# Patient Record
Sex: FEMALE | Race: WHITE | NOT HISPANIC OR LATINO | Employment: FULL TIME | URBAN - METROPOLITAN AREA
[De-identification: names, ages, dates, MRNs, and addresses within clinical notes are randomized per-mention and may not be internally consistent; named-entity substitution may affect disease eponyms.]

---

## 2022-06-16 ENCOUNTER — LAB REQUISITION (OUTPATIENT)
Dept: LAB | Facility: CLINIC | Age: 32
End: 2022-06-16

## 2022-06-16 DIAGNOSIS — Z34.01 ENCOUNTER FOR SUPERVISION OF NORMAL FIRST PREGNANCY, FIRST TRIMESTER: ICD-10-CM

## 2022-06-16 LAB
ABO/RH(D): NORMAL
ANTIBODY SCREEN: NEGATIVE
BASOPHILS # BLD AUTO: 0 10E3/UL (ref 0–0.2)
BASOPHILS NFR BLD AUTO: 1 %
EOSINOPHIL # BLD AUTO: 0.3 10E3/UL (ref 0–0.7)
EOSINOPHIL NFR BLD AUTO: 4 %
ERYTHROCYTE [DISTWIDTH] IN BLOOD BY AUTOMATED COUNT: 11.8 % (ref 10–15)
HCT VFR BLD AUTO: 36.3 % (ref 35–47)
HGB BLD-MCNC: 12.3 G/DL (ref 11.7–15.7)
IMM GRANULOCYTES # BLD: 0 10E3/UL
IMM GRANULOCYTES NFR BLD: 0 %
LYMPHOCYTES # BLD AUTO: 1.4 10E3/UL (ref 0.8–5.3)
LYMPHOCYTES NFR BLD AUTO: 21 %
MCH RBC QN AUTO: 30.8 PG (ref 26.5–33)
MCHC RBC AUTO-ENTMCNC: 33.9 G/DL (ref 31.5–36.5)
MCV RBC AUTO: 91 FL (ref 78–100)
MONOCYTES # BLD AUTO: 0.4 10E3/UL (ref 0–1.3)
MONOCYTES NFR BLD AUTO: 7 %
NEUTROPHILS # BLD AUTO: 4.4 10E3/UL (ref 1.6–8.3)
NEUTROPHILS NFR BLD AUTO: 67 %
NRBC # BLD AUTO: 0 10E3/UL
NRBC BLD AUTO-RTO: 0 /100
PLATELET # BLD AUTO: 245 10E3/UL (ref 150–450)
RBC # BLD AUTO: 3.99 10E6/UL (ref 3.8–5.2)
SPECIMEN EXPIRATION DATE: NORMAL
SPECIMEN EXPIRATION DATE: NORMAL
WBC # BLD AUTO: 6.5 10E3/UL (ref 4–11)

## 2022-06-16 PROCEDURE — 87591 N.GONORRHOEAE DNA AMP PROB: CPT | Performed by: FAMILY MEDICINE

## 2022-06-16 PROCEDURE — 86780 TREPONEMA PALLIDUM: CPT | Performed by: FAMILY MEDICINE

## 2022-06-16 PROCEDURE — 87340 HEPATITIS B SURFACE AG IA: CPT | Performed by: FAMILY MEDICINE

## 2022-06-16 PROCEDURE — 85025 COMPLETE CBC W/AUTO DIFF WBC: CPT | Performed by: FAMILY MEDICINE

## 2022-06-16 PROCEDURE — 87624 HPV HI-RISK TYP POOLED RSLT: CPT | Performed by: FAMILY MEDICINE

## 2022-06-16 PROCEDURE — 87086 URINE CULTURE/COLONY COUNT: CPT | Performed by: FAMILY MEDICINE

## 2022-06-16 PROCEDURE — 86803 HEPATITIS C AB TEST: CPT | Performed by: FAMILY MEDICINE

## 2022-06-16 PROCEDURE — 87389 HIV-1 AG W/HIV-1&-2 AB AG IA: CPT | Performed by: FAMILY MEDICINE

## 2022-06-16 PROCEDURE — G0145 SCR C/V CYTO,THINLAYER,RESCR: HCPCS | Performed by: FAMILY MEDICINE

## 2022-06-16 PROCEDURE — 86850 RBC ANTIBODY SCREEN: CPT | Performed by: FAMILY MEDICINE

## 2022-06-16 PROCEDURE — 86762 RUBELLA ANTIBODY: CPT | Performed by: FAMILY MEDICINE

## 2022-06-16 PROCEDURE — 86901 BLOOD TYPING SEROLOGIC RH(D): CPT | Performed by: FAMILY MEDICINE

## 2022-06-17 LAB
HBV SURFACE AG SERPL QL IA: NONREACTIVE
HCV AB SERPL QL IA: NEGATIVE
HIV 1+2 AB+HIV1 P24 AG SERPL QL IA: NEGATIVE
RUBV IGG SERPL QL IA: 1.65 INDEX
RUBV IGG SERPL QL IA: POSITIVE
T PALLIDUM AB SER QL: NONREACTIVE

## 2022-06-18 LAB
BACTERIA UR CULT: NO GROWTH
C TRACH DNA SPEC QL PROBE+SIG AMP: NEGATIVE
N GONORRHOEA DNA SPEC QL NAA+PROBE: NEGATIVE

## 2022-06-21 LAB
BKR LAB AP GYN ADEQUACY: NORMAL
BKR LAB AP GYN INTERPRETATION: NORMAL
BKR LAB AP HPV REFLEX: NORMAL
BKR LAB AP LMP: NORMAL
BKR LAB AP PREVIOUS ABNL DX: NORMAL
BKR LAB AP PREVIOUS ABNORMAL: NORMAL
PATH REPORT.COMMENTS IMP SPEC: NORMAL
PATH REPORT.COMMENTS IMP SPEC: NORMAL
PATH REPORT.RELEVANT HX SPEC: NORMAL

## 2022-06-23 LAB
HUMAN PAPILLOMA VIRUS 16 DNA: NEGATIVE
HUMAN PAPILLOMA VIRUS 18 DNA: NEGATIVE
HUMAN PAPILLOMA VIRUS FINAL DIAGNOSIS: NORMAL
HUMAN PAPILLOMA VIRUS OTHER HR: NEGATIVE

## 2022-07-22 ENCOUNTER — LAB REQUISITION (OUTPATIENT)
Dept: LAB | Facility: CLINIC | Age: 32
End: 2022-07-22
Payer: COMMERCIAL

## 2022-07-22 DIAGNOSIS — Z03.818 ENCOUNTER FOR OBSERVATION FOR SUSPECTED EXPOSURE TO OTHER BIOLOGICAL AGENTS RULED OUT: ICD-10-CM

## 2022-07-22 PROCEDURE — U0003 INFECTIOUS AGENT DETECTION BY NUCLEIC ACID (DNA OR RNA); SEVERE ACUTE RESPIRATORY SYNDROME CORONAVIRUS 2 (SARS-COV-2) (CORONAVIRUS DISEASE [COVID-19]), AMPLIFIED PROBE TECHNIQUE, MAKING USE OF HIGH THROUGHPUT TECHNOLOGIES AS DESCRIBED BY CMS-2020-01-R: HCPCS | Mod: ORL | Performed by: FAMILY MEDICINE

## 2022-07-23 LAB — SARS-COV-2 RNA RESP QL NAA+PROBE: NEGATIVE

## 2022-10-07 ENCOUNTER — LAB REQUISITION (OUTPATIENT)
Dept: LAB | Facility: CLINIC | Age: 32
End: 2022-10-07

## 2022-10-07 DIAGNOSIS — Z34.02 ENCOUNTER FOR SUPERVISION OF NORMAL FIRST PREGNANCY, SECOND TRIMESTER: ICD-10-CM

## 2022-10-07 PROCEDURE — 86780 TREPONEMA PALLIDUM: CPT | Performed by: FAMILY MEDICINE

## 2022-10-08 LAB — T PALLIDUM AB SER QL: NONREACTIVE

## 2022-11-25 ENCOUNTER — TRANSFERRED RECORDS (OUTPATIENT)
Dept: HEALTH INFORMATION MANAGEMENT | Facility: CLINIC | Age: 32
End: 2022-11-25

## 2022-12-16 ENCOUNTER — LAB REQUISITION (OUTPATIENT)
Dept: LAB | Facility: CLINIC | Age: 32
End: 2022-12-16

## 2022-12-16 ENCOUNTER — TRANSFERRED RECORDS (OUTPATIENT)
Dept: HEALTH INFORMATION MANAGEMENT | Facility: CLINIC | Age: 32
End: 2022-12-16

## 2022-12-16 DIAGNOSIS — Z34.03 ENCOUNTER FOR SUPERVISION OF NORMAL FIRST PREGNANCY, THIRD TRIMESTER: ICD-10-CM

## 2022-12-16 PROCEDURE — 87653 STREP B DNA AMP PROBE: CPT | Performed by: FAMILY MEDICINE

## 2022-12-17 LAB — GP B STREP DNA SPEC QL NAA+PROBE: NEGATIVE

## 2023-01-06 ENCOUNTER — TRANSFERRED RECORDS (OUTPATIENT)
Dept: HEALTH INFORMATION MANAGEMENT | Facility: CLINIC | Age: 33
End: 2023-01-06

## 2023-01-14 ENCOUNTER — ANESTHESIA (OUTPATIENT)
Dept: OBGYN | Facility: HOSPITAL | Age: 33
End: 2023-01-14
Payer: COMMERCIAL

## 2023-01-14 ENCOUNTER — HOSPITAL ENCOUNTER (INPATIENT)
Facility: HOSPITAL | Age: 33
LOS: 2 days | Discharge: HOME | End: 2023-01-16
Attending: FAMILY MEDICINE | Admitting: FAMILY MEDICINE
Payer: COMMERCIAL

## 2023-01-14 ENCOUNTER — ANESTHESIA EVENT (OUTPATIENT)
Dept: OBGYN | Facility: HOSPITAL | Age: 33
End: 2023-01-14
Payer: COMMERCIAL

## 2023-01-14 DIAGNOSIS — Z37.9 VACUUM-ASSISTED VAGINAL DELIVERY: Primary | ICD-10-CM

## 2023-01-14 PROBLEM — Z34.90 PREGNANCY: Status: ACTIVE | Noted: 2023-01-14

## 2023-01-14 LAB
ABO/RH(D): NORMAL
ANTIBODY SCREEN: NEGATIVE
HOLD SPECIMEN: NORMAL
SPECIMEN EXPIRATION DATE: NORMAL

## 2023-01-14 PROCEDURE — 120N000001 HC R&B MED SURG/OB

## 2023-01-14 PROCEDURE — 250N000011 HC RX IP 250 OP 636: Performed by: FAMILY MEDICINE

## 2023-01-14 PROCEDURE — 722N000001 HC LABOR CARE VAGINAL DELIVERY SINGLE

## 2023-01-14 PROCEDURE — 00HU33Z INSERTION OF INFUSION DEVICE INTO SPINAL CANAL, PERCUTANEOUS APPROACH: ICD-10-PCS | Performed by: ANESTHESIOLOGY

## 2023-01-14 PROCEDURE — 86780 TREPONEMA PALLIDUM: CPT | Performed by: FAMILY MEDICINE

## 2023-01-14 PROCEDURE — 258N000003 HC RX IP 258 OP 636: Performed by: FAMILY MEDICINE

## 2023-01-14 PROCEDURE — 86850 RBC ANTIBODY SCREEN: CPT | Performed by: FAMILY MEDICINE

## 2023-01-14 PROCEDURE — 250N000011 HC RX IP 250 OP 636: Performed by: ANESTHESIOLOGY

## 2023-01-14 PROCEDURE — 250N000009 HC RX 250: Performed by: FAMILY MEDICINE

## 2023-01-14 PROCEDURE — 86901 BLOOD TYPING SEROLOGIC RH(D): CPT | Performed by: FAMILY MEDICINE

## 2023-01-14 PROCEDURE — 250N000013 HC RX MED GY IP 250 OP 250 PS 637: Performed by: FAMILY MEDICINE

## 2023-01-14 PROCEDURE — 0KQM0ZZ REPAIR PERINEUM MUSCLE, OPEN APPROACH: ICD-10-PCS | Performed by: OBSTETRICS & GYNECOLOGY

## 2023-01-14 PROCEDURE — 36415 COLL VENOUS BLD VENIPUNCTURE: CPT | Performed by: FAMILY MEDICINE

## 2023-01-14 PROCEDURE — 3E0R3BZ INTRODUCTION OF ANESTHETIC AGENT INTO SPINAL CANAL, PERCUTANEOUS APPROACH: ICD-10-PCS | Performed by: ANESTHESIOLOGY

## 2023-01-14 PROCEDURE — 370N000003 HC ANESTHESIA WARD SERVICE

## 2023-01-14 RX ORDER — FENTANYL CITRATE 50 UG/ML
50 INJECTION, SOLUTION INTRAMUSCULAR; INTRAVENOUS EVERY 30 MIN PRN
Status: DISCONTINUED | OUTPATIENT
Start: 2023-01-14 | End: 2023-01-14 | Stop reason: HOSPADM

## 2023-01-14 RX ORDER — METOCLOPRAMIDE HYDROCHLORIDE 5 MG/ML
10 INJECTION INTRAMUSCULAR; INTRAVENOUS EVERY 6 HOURS PRN
Status: DISCONTINUED | OUTPATIENT
Start: 2023-01-14 | End: 2023-01-14 | Stop reason: HOSPADM

## 2023-01-14 RX ORDER — HYDROCORTISONE 25 MG/G
CREAM TOPICAL 3 TIMES DAILY PRN
Status: DISCONTINUED | OUTPATIENT
Start: 2023-01-14 | End: 2023-01-16 | Stop reason: HOSPADM

## 2023-01-14 RX ORDER — ONDANSETRON 2 MG/ML
4 INJECTION INTRAMUSCULAR; INTRAVENOUS EVERY 6 HOURS PRN
Status: DISCONTINUED | OUTPATIENT
Start: 2023-01-14 | End: 2023-01-14 | Stop reason: HOSPADM

## 2023-01-14 RX ORDER — MODIFIED LANOLIN
OINTMENT (GRAM) TOPICAL
Status: DISCONTINUED | OUTPATIENT
Start: 2023-01-14 | End: 2023-01-16 | Stop reason: HOSPADM

## 2023-01-14 RX ORDER — BALSALAZIDE DISODIUM 750 MG/1
750 CAPSULE ORAL 2 TIMES DAILY
COMMUNITY

## 2023-01-14 RX ORDER — PNV NO.118/IRON FUMARATE/FA 29 MG-1 MG
1 TABLET,CHEWABLE ORAL EVERY EVENING
Status: DISCONTINUED | OUTPATIENT
Start: 2023-01-14 | End: 2023-01-16 | Stop reason: HOSPADM

## 2023-01-14 RX ORDER — IBUPROFEN 800 MG/1
800 TABLET, FILM COATED ORAL EVERY 6 HOURS PRN
Status: DISCONTINUED | OUTPATIENT
Start: 2023-01-14 | End: 2023-01-16 | Stop reason: HOSPADM

## 2023-01-14 RX ORDER — NALOXONE HYDROCHLORIDE 0.4 MG/ML
0.4 INJECTION, SOLUTION INTRAMUSCULAR; INTRAVENOUS; SUBCUTANEOUS
Status: DISCONTINUED | OUTPATIENT
Start: 2023-01-14 | End: 2023-01-14 | Stop reason: HOSPADM

## 2023-01-14 RX ORDER — KETOROLAC TROMETHAMINE 30 MG/ML
30 INJECTION, SOLUTION INTRAMUSCULAR; INTRAVENOUS
Status: DISCONTINUED | OUTPATIENT
Start: 2023-01-14 | End: 2023-01-16 | Stop reason: HOSPADM

## 2023-01-14 RX ORDER — SODIUM CHLORIDE, SODIUM LACTATE, POTASSIUM CHLORIDE, CALCIUM CHLORIDE 600; 310; 30; 20 MG/100ML; MG/100ML; MG/100ML; MG/100ML
INJECTION, SOLUTION INTRAVENOUS CONTINUOUS PRN
Status: DISCONTINUED | OUTPATIENT
Start: 2023-01-14 | End: 2023-01-14 | Stop reason: HOSPADM

## 2023-01-14 RX ORDER — ONDANSETRON 4 MG/1
4 TABLET, ORALLY DISINTEGRATING ORAL EVERY 6 HOURS PRN
Status: DISCONTINUED | OUTPATIENT
Start: 2023-01-14 | End: 2023-01-14 | Stop reason: HOSPADM

## 2023-01-14 RX ORDER — OXYTOCIN/0.9 % SODIUM CHLORIDE 30/500 ML
340 PLASTIC BAG, INJECTION (ML) INTRAVENOUS CONTINUOUS PRN
Status: DISCONTINUED | OUTPATIENT
Start: 2023-01-14 | End: 2023-01-16 | Stop reason: HOSPADM

## 2023-01-14 RX ORDER — PRENATAL VIT/IRON FUM/FOLIC AC 27MG-0.8MG
1 TABLET ORAL
Status: DISCONTINUED | OUTPATIENT
Start: 2023-01-14 | End: 2023-01-14

## 2023-01-14 RX ORDER — BISACODYL 10 MG
10 SUPPOSITORY, RECTAL RECTAL DAILY PRN
Status: DISCONTINUED | OUTPATIENT
Start: 2023-01-14 | End: 2023-01-16 | Stop reason: HOSPADM

## 2023-01-14 RX ORDER — OXYTOCIN 10 [USP'U]/ML
10 INJECTION, SOLUTION INTRAMUSCULAR; INTRAVENOUS
Status: DISCONTINUED | OUTPATIENT
Start: 2023-01-14 | End: 2023-01-14 | Stop reason: HOSPADM

## 2023-01-14 RX ORDER — NALBUPHINE HYDROCHLORIDE 10 MG/ML
2.5-5 INJECTION, SOLUTION INTRAMUSCULAR; INTRAVENOUS; SUBCUTANEOUS EVERY 6 HOURS PRN
Status: DISCONTINUED | OUTPATIENT
Start: 2023-01-14 | End: 2023-01-16 | Stop reason: HOSPADM

## 2023-01-14 RX ORDER — OXYTOCIN/0.9 % SODIUM CHLORIDE 30/500 ML
100-340 PLASTIC BAG, INJECTION (ML) INTRAVENOUS CONTINUOUS PRN
Status: DISCONTINUED | OUTPATIENT
Start: 2023-01-14 | End: 2023-01-16 | Stop reason: HOSPADM

## 2023-01-14 RX ORDER — OXYTOCIN 10 [USP'U]/ML
10 INJECTION, SOLUTION INTRAMUSCULAR; INTRAVENOUS
Status: DISCONTINUED | OUTPATIENT
Start: 2023-01-14 | End: 2023-01-16 | Stop reason: HOSPADM

## 2023-01-14 RX ORDER — METHYLERGONOVINE MALEATE 0.2 MG/ML
200 INJECTION INTRAVENOUS
Status: DISCONTINUED | OUTPATIENT
Start: 2023-01-14 | End: 2023-01-16 | Stop reason: HOSPADM

## 2023-01-14 RX ORDER — IBUPROFEN 800 MG/1
800 TABLET, FILM COATED ORAL
Status: DISCONTINUED | OUTPATIENT
Start: 2023-01-14 | End: 2023-01-16 | Stop reason: HOSPADM

## 2023-01-14 RX ORDER — SODIUM CHLORIDE, SODIUM LACTATE, POTASSIUM CHLORIDE, CALCIUM CHLORIDE 600; 310; 30; 20 MG/100ML; MG/100ML; MG/100ML; MG/100ML
INJECTION, SOLUTION INTRAVENOUS CONTINUOUS
Status: DISCONTINUED | OUTPATIENT
Start: 2023-01-14 | End: 2023-01-14

## 2023-01-14 RX ORDER — FENTANYL/ROPIVACAINE/NS/PF 2MCG/ML-.1
PLASTIC BAG, INJECTION (ML) EPIDURAL
Status: DISCONTINUED | OUTPATIENT
Start: 2023-01-14 | End: 2023-01-14 | Stop reason: HOSPADM

## 2023-01-14 RX ORDER — PROCHLORPERAZINE 25 MG
25 SUPPOSITORY, RECTAL RECTAL EVERY 12 HOURS PRN
Status: DISCONTINUED | OUTPATIENT
Start: 2023-01-14 | End: 2023-01-14 | Stop reason: HOSPADM

## 2023-01-14 RX ORDER — MISOPROSTOL 200 UG/1
800 TABLET ORAL
Status: DISCONTINUED | OUTPATIENT
Start: 2023-01-14 | End: 2023-01-16 | Stop reason: HOSPADM

## 2023-01-14 RX ORDER — ACETAMINOPHEN 325 MG/1
650 TABLET ORAL EVERY 4 HOURS PRN
Status: DISCONTINUED | OUTPATIENT
Start: 2023-01-14 | End: 2023-01-16 | Stop reason: HOSPADM

## 2023-01-14 RX ORDER — PROCHLORPERAZINE MALEATE 10 MG
10 TABLET ORAL EVERY 6 HOURS PRN
Status: DISCONTINUED | OUTPATIENT
Start: 2023-01-14 | End: 2023-01-14 | Stop reason: HOSPADM

## 2023-01-14 RX ORDER — BALSALAZIDE DISODIUM 750 MG/1
2250 CAPSULE ORAL 2 TIMES DAILY
Status: DISCONTINUED | OUTPATIENT
Start: 2023-01-14 | End: 2023-01-16 | Stop reason: HOSPADM

## 2023-01-14 RX ORDER — CARBOPROST TROMETHAMINE 250 UG/ML
250 INJECTION, SOLUTION INTRAMUSCULAR
Status: DISCONTINUED | OUTPATIENT
Start: 2023-01-14 | End: 2023-01-14 | Stop reason: HOSPADM

## 2023-01-14 RX ORDER — DOCUSATE SODIUM 100 MG/1
100 CAPSULE, LIQUID FILLED ORAL DAILY
Status: DISCONTINUED | OUTPATIENT
Start: 2023-01-15 | End: 2023-01-16 | Stop reason: HOSPADM

## 2023-01-14 RX ORDER — NALOXONE HYDROCHLORIDE 0.4 MG/ML
0.2 INJECTION, SOLUTION INTRAMUSCULAR; INTRAVENOUS; SUBCUTANEOUS
Status: DISCONTINUED | OUTPATIENT
Start: 2023-01-14 | End: 2023-01-14 | Stop reason: HOSPADM

## 2023-01-14 RX ORDER — MISOPROSTOL 200 UG/1
400 TABLET ORAL
Status: DISCONTINUED | OUTPATIENT
Start: 2023-01-14 | End: 2023-01-14 | Stop reason: HOSPADM

## 2023-01-14 RX ORDER — METHYLERGONOVINE MALEATE 0.2 MG/ML
200 INJECTION INTRAVENOUS
Status: DISCONTINUED | OUTPATIENT
Start: 2023-01-14 | End: 2023-01-14 | Stop reason: HOSPADM

## 2023-01-14 RX ORDER — METOCLOPRAMIDE 10 MG/1
10 TABLET ORAL EVERY 6 HOURS PRN
Status: DISCONTINUED | OUTPATIENT
Start: 2023-01-14 | End: 2023-01-14 | Stop reason: HOSPADM

## 2023-01-14 RX ORDER — MISOPROSTOL 200 UG/1
400 TABLET ORAL
Status: DISCONTINUED | OUTPATIENT
Start: 2023-01-14 | End: 2023-01-16 | Stop reason: HOSPADM

## 2023-01-14 RX ORDER — LIDOCAINE 40 MG/G
CREAM TOPICAL
Status: DISCONTINUED | OUTPATIENT
Start: 2023-01-14 | End: 2023-01-14 | Stop reason: HOSPADM

## 2023-01-14 RX ORDER — CARBOPROST TROMETHAMINE 250 UG/ML
250 INJECTION, SOLUTION INTRAMUSCULAR
Status: DISCONTINUED | OUTPATIENT
Start: 2023-01-14 | End: 2023-01-16 | Stop reason: HOSPADM

## 2023-01-14 RX ORDER — MISOPROSTOL 200 UG/1
800 TABLET ORAL
Status: DISCONTINUED | OUTPATIENT
Start: 2023-01-14 | End: 2023-01-14 | Stop reason: HOSPADM

## 2023-01-14 RX ORDER — CITRIC ACID/SODIUM CITRATE 334-500MG
30 SOLUTION, ORAL ORAL
Status: DISCONTINUED | OUTPATIENT
Start: 2023-01-14 | End: 2023-01-14 | Stop reason: HOSPADM

## 2023-01-14 RX ORDER — OXYTOCIN/0.9 % SODIUM CHLORIDE 30/500 ML
1-24 PLASTIC BAG, INJECTION (ML) INTRAVENOUS CONTINUOUS
Status: DISCONTINUED | OUTPATIENT
Start: 2023-01-14 | End: 2023-01-14 | Stop reason: HOSPADM

## 2023-01-14 RX ORDER — FENTANYL CITRATE-0.9 % NACL/PF 10 MCG/ML
100 PLASTIC BAG, INJECTION (ML) INTRAVENOUS EVERY 5 MIN PRN
Status: DISCONTINUED | OUTPATIENT
Start: 2023-01-14 | End: 2023-01-14 | Stop reason: HOSPADM

## 2023-01-14 RX ORDER — BUPIVACAINE HYDROCHLORIDE 2.5 MG/ML
INJECTION, SOLUTION EPIDURAL; INFILTRATION; INTRACAUDAL
Status: COMPLETED | OUTPATIENT
Start: 2023-01-14 | End: 2023-01-14

## 2023-01-14 RX ORDER — OXYTOCIN/0.9 % SODIUM CHLORIDE 30/500 ML
340 PLASTIC BAG, INJECTION (ML) INTRAVENOUS CONTINUOUS PRN
Status: DISCONTINUED | OUTPATIENT
Start: 2023-01-14 | End: 2023-01-14 | Stop reason: HOSPADM

## 2023-01-14 RX ADMIN — SODIUM CHLORIDE, POTASSIUM CHLORIDE, SODIUM LACTATE AND CALCIUM CHLORIDE 500 ML: 600; 310; 30; 20 INJECTION, SOLUTION INTRAVENOUS at 08:57

## 2023-01-14 RX ADMIN — LIDOCAINE HYDROCHLORIDE 20 ML: 10 INJECTION, SOLUTION EPIDURAL; INFILTRATION; INTRACAUDAL; PERINEURAL at 22:40

## 2023-01-14 RX ADMIN — Medication 100 MCG: at 10:53

## 2023-01-14 RX ADMIN — KETOROLAC TROMETHAMINE 30 MG: 30 INJECTION, SOLUTION INTRAMUSCULAR; INTRAVENOUS at 23:29

## 2023-01-14 RX ADMIN — Medication: at 10:45

## 2023-01-14 RX ADMIN — Medication 2 MILLI-UNITS/MIN: at 21:10

## 2023-01-14 RX ADMIN — Medication 100 MCG: at 10:52

## 2023-01-14 RX ADMIN — BALSALAZIDE DISODIUM 2250 MG: 750 CAPSULE ORAL at 23:29

## 2023-01-14 RX ADMIN — Medication 1 CHEW TAB: at 23:35

## 2023-01-14 RX ADMIN — BUPIVACAINE HYDROCHLORIDE 3 ML: 2.5 INJECTION, SOLUTION EPIDURAL; INFILTRATION; INTRACAUDAL at 10:52

## 2023-01-14 RX ADMIN — SODIUM CHLORIDE, POTASSIUM CHLORIDE, SODIUM LACTATE AND CALCIUM CHLORIDE 125 ML/HR: 600; 310; 30; 20 INJECTION, SOLUTION INTRAVENOUS at 11:03

## 2023-01-14 RX ADMIN — ACETAMINOPHEN 650 MG: 325 TABLET ORAL at 18:00

## 2023-01-14 ASSESSMENT — ACTIVITIES OF DAILY LIVING (ADL)
ADLS_ACUITY_SCORE: 18
DIFFICULTY_EATING/SWALLOWING: NO
DOING_ERRANDS_INDEPENDENTLY_DIFFICULTY: NO
FALL_HISTORY_WITHIN_LAST_SIX_MONTHS: NO
CHANGE_IN_FUNCTIONAL_STATUS_SINCE_ONSET_OF_CURRENT_ILLNESS/INJURY: NO
ADLS_ACUITY_SCORE: 18
ADLS_ACUITY_SCORE: 18
WALKING_OR_CLIMBING_STAIRS_DIFFICULTY: NO
ADLS_ACUITY_SCORE: 35
ADLS_ACUITY_SCORE: 18
CONCENTRATING,_REMEMBERING_OR_MAKING_DECISIONS_DIFFICULTY: NO
DRESSING/BATHING_DIFFICULTY: NO
ADLS_ACUITY_SCORE: 18
WEAR_GLASSES_OR_BLIND: NO
ADLS_ACUITY_SCORE: 18
ADLS_ACUITY_SCORE: 18
TOILETING_ISSUES: NO

## 2023-01-14 NOTE — PROGRESS NOTES
Called by nursing for 7 min long decel to 70's that resolved with position changes to left side then hands and knees. Now FHT's in 140's with mild to moderate variability, with occ accel and no further decels in the last 40 minutes. Pt comfortable with Epidural    Cx 5-6/90/0  Fetal head direct OP  Ctx's q 2-4 min    A:  IUP at 40+ weeks  2 episodes of prolonged decels, one hours ago and just now, now back to category 1  Membranes intact    P:  Position changes to try and change baby position  Reviewed possible IUPC/FSE but currently picking up contractions and FHT's well  Monitor closely      Miley Corrales MD

## 2023-01-14 NOTE — PROGRESS NOTES
"Many position changes, feeling more pressure and more of contractions    O: /70   Temp 98.6  F (37  C) (Oral)   Resp 18   Ht 1.613 m (5' 3.5\")   Wt 74.4 kg (164 lb)   SpO2 99%   BMI 28.60 kg/m      FHT's 140's with good LTV, many accels, no decels since 7 min at approx 1330  Cx 8-9/100/+1 buldgy bag      AROM successful with thick mec    A:   IUP 40+ weeks, progressing in labor  Thick mec  Category 1 strip    P:  Anticipate     Miley Corrales MD   "

## 2023-01-14 NOTE — ANESTHESIA PREPROCEDURE EVALUATION
Anesthesia Pre-Procedure Evaluation    Patient: Blanca Delong   MRN: [id number] : 1990        Procedure : * No procedures listed *          Past Medical History:   Diagnosis Date     Ulcerative colitis (H) 2004     Uncomplicated asthma       History reviewed. No pertinent surgical history.   No Known Allergies   Social History     Tobacco Use     Smoking status: Never     Passive exposure: Never     Smokeless tobacco: Never   Substance Use Topics     Alcohol use: Not Currently      Wt Readings from Last 1 Encounters:   23 74.4 kg (164 lb)        Anesthesia Evaluation            ROS/MED HX  ENT/Pulmonary:     (+) asthma     Neurologic:  - neg neurologic ROS     Cardiovascular:  - neg cardiovascular ROS     METS/Exercise Tolerance: >4 METS    Hematologic:  - neg hematologic  ROS     Musculoskeletal:  - neg musculoskeletal ROS     GI/Hepatic:  - neg GI/hepatic ROS     Renal/Genitourinary:  - neg Renal ROS     Endo:  - neg endo ROS     Psychiatric/Substance Use:  - neg psychiatric ROS     Infectious Disease:  - neg infectious disease ROS     Malignancy:  - neg malignancy ROS     Other:      (+) Possibly pregnant, ,         Physical Exam    Airway        Mallampati: II   TM distance: > 3 FB   Neck ROM: full     Respiratory Devices and Support         Dental  no notable dental history         Cardiovascular          Rhythm and rate: regular and normal     Pulmonary           breath sounds clear to auscultation           OUTSIDE LABS:  CBC:   Lab Results   Component Value Date    WBC 6.5 2022    HGB 12.3 2022    HCT 36.3 2022     2022     BMP: No results found for: NA, POTASSIUM, CHLORIDE, CO2, BUN, CR, GLC  COAGS: No results found for: PTT, INR, FIBR  POC: No results found for: BGM, HCG, HCGS  HEPATIC: No results found for: ALBUMIN, PROTTOTAL, ALT, AST, GGT, ALKPHOS, BILITOTAL, BILIDIRECT, JOSE  OTHER: No results found for: PH, LACT, A1C, TAWANDA, PHOS, MAG, LIPASE,  AMYLASE, TSH, T4, T3, CRP, SED    Anesthesia Plan    ASA Status:  2      Anesthesia Type: Epidural.              Consents    Anesthesia Plan(s) and associated risks, benefits, and realistic alternatives discussed. Questions answered and patient/representative(s) expressed understanding.    - Discussed:     - Discussed with:  Patient         Postoperative Care            Comments:                Radha Baldwin MD

## 2023-01-14 NOTE — H&P
Maternal Admission H&P  [facility]  Date of Admission: 2023  Date of Service: 2023    Name      Blanca Delong         1990  MRN       [id number]  PCP        Miley Corrales         Assessment and Plan  32 year old  at 40w5d in active labor.    Anticipate .    Analgesia per patient's wishes    Group B strep: negative    PPH risk 0  - Ulcerative Colitis has been quiescent during pregnancy on Balsalazide    Chief Complaint  Contractions    HPI  Blanca Delong is a 32 year old woman at 40w5d, based on an Estimated Date of Delivery: 2023. She presents with contractions that began last night.     Patient Active Problem List    Diagnosis Date Noted     Pregnancy 2023     Priority: Medium      OB History    Para Term  AB Living   1 0 0 0 0 0   SAB IAB Ectopic Multiple Live Births   0 0 0 0 0      # Outcome Date GA Lbr Kamaljit/2nd Weight Sex Delivery Anes PTL Lv   1 Current                Review of Systems   Negative except what is noted in HPI.     Past Medical History  Past Medical History:   Diagnosis Date     Ulcerative colitis (H)      Uncomplicated asthma         Past Surgical History  History reviewed. No pertinent surgical history.    Allergies  Patient has no known allergies.    Family History  History reviewed. No pertinent family history.    Social History  I have reviewed this patient's social history and updated it with pertinent information if needed. Blanca Delong  reports that she has never smoked. She has never been exposed to tobacco smoke. She has never used smokeless tobacco. She reports that she does not currently use alcohol. She reports that she does not use drugs.    Prior to Admission Medication List  Medications Prior to Admission   Medication Sig Dispense Refill Last Dose     balsalazide (COLAZAL) 750 MG capsule Take 750 mg by mouth 3 times daily        Prenatal Vit-Fe  "Sulfate-FA (PRENATAL MULTIVIT-IRON PO) Take 1 tablet by mouth           Allergies  No Known Allergies    There is no immunization history on file for this patient.     Physical Exam  /63   Temp 98  F (36.7  C)   Resp 18   Ht 1.613 m (5' 3.5\")   Wt 74.4 kg (164 lb)   SpO2 97%   BMI 28.60 kg/m    HEART: RRR, no murmur  LUNGS: CTA bilaterally  Fetal Heart Tones: 140's baseline, moderate variablility, + accels, variables occassional and Category I currently, just had a prolonged decel 4 minutes to 90's after Epidural, resolved with repositioning  CONTRACTIONS:  Frequency q 2-3 minutes  CERVIX: 5/90/0  FLUID: None noted.  Fetal Presentation: vertex.    Labs  Group B Strep PCR   Date Value Ref Range Status   12/16/2022 Negative Negative Final     Comment:     Presumed negative for Streptococcus agalactiae (Group B Streptococcus) or the number of organisms may be below the limit of detection of the assay.      Antibody Screen   Date Value Ref Range Status   01/14/2023 Negative Negative Final     Hepatitis B Surface Antigen   Date Value Ref Range Status   06/16/2022 Nonreactive Nonreactive Final     Hemoglobin   Date Value Ref Range Status   06/16/2022 12.3 11.7 - 15.7 g/dL Final      Admission on 01/14/2023   Component Date Value Ref Range Status     ABO/RH(D) 01/14/2023 A POS   Final     Antibody Screen 01/14/2023 Negative  Negative Final     SPECIMEN EXPIRATION DATE 01/14/2023 [id number]   Final     Hold Specimen 01/14/2023 [facility]   Final        Completed by:   Miley Corrales MD  [facility]  1/14/2023 11:18 AM    "

## 2023-01-14 NOTE — PLAN OF CARE
Blanca and her SO Vel came to the unit stating she is in labor. Blanca stated positive fetal movement, no leaking of fluid or bleeding and contractions since 9pm last night.     Monitors applied.     RN notified Dr. Corrales of Category II strip with variables. SVE 4/90/-1. RN believes vertex. Orders for admission and for a 500 ml bolus of LR.    RN will complete admission.      Problem: Labor  Goal: Acceptable Pain Control  Outcome: Progressing     Blanca is coping well with contractions. Plans for an epidural at some point      Problem: Labor  Goal: Normal Uterine Contraction Pattern  Outcome: Progressing    Regular contractions      Problem: Labor  Goal: Stable Fetal Wellbeing  Outcome: Progressing     Efm is now category I with repositioning and  a fluid bolus

## 2023-01-14 NOTE — ANESTHESIA PROCEDURE NOTES
Epidural catheter Procedure Note    Pre-Procedure   Staff -        Anesthesiologist:  Radha Baldwin MD       Performed By: anesthesiologist       Location: OB       Procedure Start/Stop Times: 1/14/2023 10:36 AM and 1/14/2023 10:59 AM       Pre-Anesthestic Checklist: patient identified, IV checked, risks and benefits discussed, informed consent, monitors and equipment checked, pre-op evaluation, at physician/surgeon's request and post-op pain management  Timeout:       Correct Patient: Yes        Correct Procedure: Yes        Correct Site: Yes        Correct Position: Yes   Procedure Documentation  Procedure: epidural catheter       Diagnosis: Labor       Patient Position: sitting       Skin prep: Chloraprep       Local skin infiltrated with 3 mL of 1% lidocaine.        Insertion Site: L3-4. (midline approach).       Technique: LORT saline        ELHAM at 6 cm.       Needle Type: [place]       Needle Gauge: 18.        Needle Length (Inches): 3.5        Catheter: 20 G.          Catheter threaded easily.         7 cm epidural space.         Threaded 14 cm at skin.         # of attempts: 1 and  # of redirects:  0    Assessment/Narrative         Paresthesias: No.       Test dose of 5 mL lidocaine 1.5% w/ 1:200,000 epinephrine at 10:45 CST.         Test dose negative, 3 minutes after injection, for signs of intravascular, subdural, or intrathecal injection.       Insertion/Infusion Method: LORT saline       Aspiration negative for Heme or CSF via Epidural Catheter.       Sensory Level Left: T10.       Sensory Level Right: T10.    Medication(s) Administered   0.25% Bupivacaine PF (Epidural) - EPIDURAL   3 mL - 1/14/2023 10:52:00 AM  Medication Administration Time: 1/14/2023 10:36 AM     Comments:  Facile placement, single attempt, single pass.  Patient tolerated it well and without complication.    Lot#[id number]      FOR [facility] ONLY:   Pain Team Contact information: please page the Pain Team  "Via DocASAP. Search \"Pain\". During daytime hours, please page the attending first. At night please page the resident first.    "

## 2023-01-14 NOTE — PLAN OF CARE
Problem: Labor  Goal: Stable Fetal Wellbeing  2023 by Alena Collado, RN  Outcome: Progressing  2023 0900 by Alena Collado RN  Outcome: Progressing     Efm has been category I      Problem: Labor  Goal: Acceptable Pain Control  2023 by Alena Collado, RN  Outcome: Progressing  2023 by Alena Collado RN  Outcome: Progressing     Blanca is coping well with the epidural, she feels pressure with each contraction      Problem: Labor  Goal: Normal Uterine Contraction Pattern  2023 by Alena Collado, RN  Outcome: Progressing  2023 by Alena Collado RN  Outcome: Progressing     Alistair every 2-3 minutes    Plan for , call the team for mec fluid

## 2023-01-15 LAB
HGB BLD-MCNC: 10.2 G/DL (ref 11.7–15.7)
T PALLIDUM AB SER QL: NONREACTIVE

## 2023-01-15 PROCEDURE — 36415 COLL VENOUS BLD VENIPUNCTURE: CPT | Performed by: OBSTETRICS & GYNECOLOGY

## 2023-01-15 PROCEDURE — 250N000013 HC RX MED GY IP 250 OP 250 PS 637: Performed by: FAMILY MEDICINE

## 2023-01-15 PROCEDURE — 120N000001 HC R&B MED SURG/OB

## 2023-01-15 PROCEDURE — 85018 HEMOGLOBIN: CPT | Performed by: OBSTETRICS & GYNECOLOGY

## 2023-01-15 PROCEDURE — 250N000013 HC RX MED GY IP 250 OP 250 PS 637: Performed by: OBSTETRICS & GYNECOLOGY

## 2023-01-15 RX ADMIN — ACETAMINOPHEN 650 MG: 325 TABLET ORAL at 16:31

## 2023-01-15 RX ADMIN — DOCUSATE SODIUM 100 MG: 100 CAPSULE, LIQUID FILLED ORAL at 09:23

## 2023-01-15 RX ADMIN — IBUPROFEN 800 MG: 800 TABLET ORAL at 20:16

## 2023-01-15 RX ADMIN — Medication 1 CHEW TAB: at 23:50

## 2023-01-15 RX ADMIN — ACETAMINOPHEN 650 MG: 325 TABLET ORAL at 23:46

## 2023-01-15 RX ADMIN — BALSALAZIDE DISODIUM 2250 MG: 750 CAPSULE ORAL at 09:23

## 2023-01-15 RX ADMIN — IBUPROFEN 800 MG: 800 TABLET ORAL at 14:42

## 2023-01-15 RX ADMIN — BALSALAZIDE DISODIUM 2250 MG: 750 CAPSULE ORAL at 20:19

## 2023-01-15 RX ADMIN — ACETAMINOPHEN 650 MG: 325 TABLET ORAL at 09:23

## 2023-01-15 ASSESSMENT — ACTIVITIES OF DAILY LIVING (ADL)
ADLS_ACUITY_SCORE: 18

## 2023-01-15 NOTE — PROGRESS NOTES
Still persistant rim despite many position changes including hands and knees. Start just low dose Pitocin    Miley Corrales MD

## 2023-01-15 NOTE — LACTATION NOTE
"This note was copied from a baby's chart.  This writer (lactation consultant) was at bedside for a feeding this morning. The best positions for a deep and comfortable latch were reviewed. Benefits of skin-to-skin reviewed. Mother was assisted with positioning in the \"football\" hold. A good latch was achieved with audible swallows. Techniques to keep  active at breast including deep breast compression was demonstrated. Mother was assisted with hand expression (1 ml colostrum, finger fed).  Encouragement provided.  Mother knows to breastfeed infant > 8-12 times in 24 hours, as infant cues, and to listen for swallows. Lactation to follow up prn.   " Per [facility], patients home infusion coverage is minimal, he does not have any  coverage for equipment and minimal coverage for the medication.     [facility] Pharmacist is Janice CONROY, and can be reached at [phone]. She stated a medication test claim would need to be run and the patient would be responsible for all equipment costs.    I am available to assist with home infusion needs at discharge.    Flor GRACE RN Home Care and Hospice Services Liaison,  D: [phone], C: [phone]

## 2023-01-15 NOTE — L&D DELIVERY NOTE
I was called to see in-house obstetrician to come to the room due to decelerations during the second stage of labor.  This is a 32-year-old  1 at 40 weeks and 5 days who presented to the hospital in labor and progressed slowly to completely dilated.  Over the last hour or 2 there were some late decelerations.  Prior to that there was a category 1 tracing.  On my arrival into the room the patient was in knee-chest position due to a deceleration for approximately 3 minutes.  On my arrival the heart tones were back in the 130s 140s which was the baseline prior to this.  The deceleration was down into the 70s.  The baby was at 0 station.  We turned the patient to the left lateral position and I had her push a couple of times.  She brought the head head down to +1 to +2 station.  The [place] catheter had been just removed.  There were late decelerations with each of these contractions.  Estimated fetal weight was about 7 and half pounds I recommended a trial of vacuum assistance to shorten the second stage of labor due to these persistent late decelerations in the setting of meconium.  Risks and benefits were reviewed.  All questions were answered.  She verbalized understanding the above.  The pediatric team was called to the room.  I placed a mighty VAC mushroom cup and on the second pole with a total of 40 seconds of pull time and no pop offs, through a median episiotomy, the head was delivered REHAN.  There was no nuchal cord.  The body and shoulders were delivered without complication and thick meconium was noted as the baby was coming out.  The baby was crying but nonetheless was cleaned and suctioned and the cord was clamped x2 and cut and the baby was handed to the pediatric team in attendance.  A segment of cord was sent for gases  The placenta was delivered spontaneously with intact membranes.  Good uterine tone was achieved with IV Pitocin and massage.  A second-degree perineal extension was infiltrated with  1% lidocaine and repaired with 3-0 repeat in the usual manner.  Quantitative blood loss was 300 cc.  Apgars of the baby were 8 and 9.

## 2023-01-15 NOTE — PROGRESS NOTES
Prolonged decel with pushing after attaining complete.  Less consulted re possible vacuum delivery vs C section    Miley Corrales MD

## 2023-01-15 NOTE — PROGRESS NOTES
"Maternal Postpartum Progress Note  [facility]  Date of Service: 1/15/2023    Name      Blanca Delong         1990  MRN       [id number]  PCP        Miley Corrales        Subjective:  The patient feels well:  Voiding without difficulty, lochia normal, tolerating normal diet, and passing flatus.  Pain is well controlled with current medications.  The patient has no emotional concerns.  No calf pain or swelling.  The baby is well and being fed by breast.    Objective:  /69 (BP Location: Right arm)   Pulse 60   Temp 98  F (36.7  C) (Oral)   Resp 18   Ht 1.613 m (5' 3.5\")   Wt 74.4 kg (164 lb)   SpO2 97%   Breastfeeding Unknown   BMI 28.60 kg/m    Lochia is minimal.  The uterine fundus is firm at umbilicus.  Urinary output is adequate. No calf tenderness.  No edema.    Labs:  Hemoglobin   Date Value Ref Range Status   01/15/2023 10.2 (L) 11.7 - 15.7 g/dL Final       Assessment:    -Postpartum Day 1 s/p vaginal delivery  -Normal postpartum course.      Plan:    -Continue current care.  -    Completed by:   Isabelle Felix MD, M.D.  [facility]  1/15/2023 10:26 AM  "

## 2023-01-15 NOTE — ANESTHESIA POSTPROCEDURE EVALUATION
Patient: Blanca Delong    Procedure: * No procedures listed *       Anesthesia Type:  Epidural    Note:  Disposition: Inpatient   Postop Pain Control: Uneventful            Sign Out: Well controlled pain   PONV: No   Neuro/Psych: Uneventful            Sign Out: Acceptable/Baseline neuro status   Airway/Respiratory: Uneventful            Sign Out: Acceptable/Baseline resp. status   CV/Hemodynamics: Uneventful            Sign Out: Acceptable CV status; No obvious hypovolemia; No obvious fluid overload   Other NRE: NONE   DID A NON-ROUTINE EVENT OCCUR? No    Event details/Postop Comments:  Neuraxial block has worn off, no residual numbness or weakness. Pain controlled. Denies headache or back pain. No further questions or concerns. Instructed that we are available 24/7 should she have questions pertaining to her epidural.             Last vitals:  Vitals:    01/15/23 0048 01/15/23 0400 01/15/23 0853   BP: 105/60 114/74 107/69   Pulse:  60    Resp:  16 18   Temp:  36.9  C (98.5  F) 36.7  C (98  F)   SpO2:  97% 97%       Electronically Signed By: Medhat Emery MD  January 15, 2023  3:31 PM

## 2023-01-15 NOTE — PLAN OF CARE
Problem: Plan of Care - These are the overarching goals to be used throughout the patient stay.    Goal: Optimal Comfort and Wellbeing  Outcome: Adequate for Care Transition  Intervention: Provide Person-Centered Care  Recent Flowsheet Documentation  Taken 1/15/2023 0900 by Angela Morris, RN  Trust Relationship/Rapport:   care explained   choices provided   emotional support provided   thoughts/feelings acknowledged   questions answered   questions encouraged     Problem: Postpartum (Vaginal Delivery)  Goal: Hemostasis  Outcome: Adequate for Care Transition  Goal: Effective Urinary Elimination  Outcome: Adequate for Care Transition     Problem: Plan of Care - These are the overarching goals to be used throughout the patient stay.    Goal: Absence of Hospital-Acquired Illness or Injury  Intervention: Prevent Skin Injury  Recent Flowsheet Documentation  Taken 1/15/2023 0900 by Angela Morris, RN  Body Position: position changed independently     Problem: Labor  Goal: Stable Fetal Wellbeing  Intervention: Promote and Monitor Fetal Wellbeing  Recent Flowsheet Documentation  Taken 1/15/2023 0900 by Angela Morris, RN  Body Position: position changed independently

## 2023-01-15 NOTE — PLAN OF CARE
Problem: Plan of Care - These are the overarching goals to be used throughout the patient stay.    Goal: Plan of Care Review  Description: The Plan of Care Review/Shift note should be completed every shift.  The Outcome Evaluation is a brief statement about your assessment that the patient is improving, declining, or no change.  This information will be displayed automatically on your shift note.  Outcome: Progressing  Flowsheets (Taken 1/15/2023 0516)  Plan of Care Reviewed With: patient     Problem: Plan of Care - These are the overarching goals to be used throughout the patient stay.    Goal: Optimal Comfort and Wellbeing  Outcome: Progressing  Intervention: Provide Person-Centered Care  Recent Flowsheet Documentation  Taken 1/15/2023 0500 by Franci Lorenzo RN  Trust Relationship/Rapport:   care explained   choices provided   emotional support provided   thoughts/feelings acknowledged   Blanca is independent with self, baby care and breastfeeding. Her vitals and post-partum assessment are WNL Franci Lorenzo RN

## 2023-01-16 VITALS
WEIGHT: 164 LBS | HEIGHT: 64 IN | HEART RATE: 70 BPM | DIASTOLIC BLOOD PRESSURE: 59 MMHG | OXYGEN SATURATION: 98 % | TEMPERATURE: 98.5 F | SYSTOLIC BLOOD PRESSURE: 119 MMHG | RESPIRATION RATE: 16 BRPM | BODY MASS INDEX: 28 KG/M2

## 2023-01-16 PROBLEM — Z37.9 VACUUM-ASSISTED VAGINAL DELIVERY: Status: ACTIVE | Noted: 2023-01-16

## 2023-01-16 PROBLEM — Z34.90 PREGNANCY: Status: RESOLVED | Noted: 2023-01-14 | Resolved: 2023-01-16

## 2023-01-16 PROCEDURE — 250N000013 HC RX MED GY IP 250 OP 250 PS 637: Performed by: FAMILY MEDICINE

## 2023-01-16 PROCEDURE — 250N000013 HC RX MED GY IP 250 OP 250 PS 637: Performed by: OBSTETRICS & GYNECOLOGY

## 2023-01-16 RX ORDER — ACETAMINOPHEN 325 MG/1
650 TABLET ORAL EVERY 4 HOURS PRN
Status: ON HOLD
Start: 2023-01-16 | End: 2024-09-16

## 2023-01-16 RX ORDER — IBUPROFEN 800 MG/1
800 TABLET, FILM COATED ORAL EVERY 6 HOURS PRN
Status: ON HOLD
Start: 2023-01-16 | End: 2024-09-13

## 2023-01-16 RX ADMIN — IBUPROFEN 800 MG: 800 TABLET ORAL at 02:05

## 2023-01-16 RX ADMIN — BALSALAZIDE DISODIUM 2250 MG: 750 CAPSULE ORAL at 08:35

## 2023-01-16 RX ADMIN — DOCUSATE SODIUM 100 MG: 100 CAPSULE, LIQUID FILLED ORAL at 08:35

## 2023-01-16 RX ADMIN — IBUPROFEN 800 MG: 800 TABLET ORAL at 08:35

## 2023-01-16 RX ADMIN — ACETAMINOPHEN 650 MG: 325 TABLET ORAL at 04:51

## 2023-01-16 ASSESSMENT — ACTIVITIES OF DAILY LIVING (ADL)
ADLS_ACUITY_SCORE: 18

## 2023-01-16 NOTE — PLAN OF CARE
"  Problem: Plan of Care - These are the overarching goals to be used throughout the patient stay.    Goal: Optimal Comfort and Wellbeing  Intervention: Monitor Pain and Promote Comfort  Recent Flowsheet Documentation  Taken 1/15/2023 2346 by Moira Spencer RN  Pain Management Interventions: medication (see MAR)  Intervention: Provide Person-Centered Care  Recent Flowsheet Documentation  Taken 1/15/2023 2347 by Moira Spencer RN  Trust Relationship/Rapport:   care explained   choices provided   empathic listening provided   questions answered   questions encouraged   reassurance provided   thoughts/feelings acknowledged     Patient expresses that she is feeling tired and stressed from lack of sleep and adjusting to having a new baby. Rest encouraged and promoted. Empathetic listening provided. Pain managed with PRN analgesics per MAR. VSS. Fundus firm, bleeding WDL.    /82 (BP Location: Right arm, Patient Position: Semi-Sosa's, Cuff Size: Adult Small)   Pulse 69   Temp 97.9  F (36.6  C) (Oral)   Resp 16   Ht 1.613 m (5' 3.5\")   Wt 74.4 kg (164 lb)   SpO2 97%   Breastfeeding Unknown   BMI 28.60 kg/m      Moira Spencer RN on 1/16/2023 at 12:51 AM    "

## 2023-01-16 NOTE — DISCHARGE SUMMARY
"Maternal Discharge Summary  [facility]  Date of Service: 2023    Name      Blanca Delong         1990  MRN       [id number]  PCP        Miley Corrales    Admit Date:  2023  Discharge Date:  2023    Principal Diagnosis:    Patient Active Problem List   Diagnosis     Pregnancy       Delivery Type: vaginal, vacuum (extractor)     Hospital Course:  Blanca Delong is a 32 year old now  s/p vaginal, vacuum (extractor)  at 40w5d on 23 at 22:39.  The patient's hospital course was unremarkable.  She recovered as anticipated and experienced no post-delivery complications. On discharge, her pain was well controlled.  Vaginal bleeding is normal, beginning to lighten/  Voiding without difficulty.  Ambulating well and tolerating a normal diet.  No fevers.      Conditions complicating Pregnancy:  Intrapartum Events:  decelerations in second stage prompting vacuum extraction  Other Complications/Significant Findings:  ulcerative colitis, quiescent during pregnancy on balsalazide    Procedure(s) Performed: Vacuum assisted vaginal delivery, by Ishmael Fiore MD    Discharge Plan:   1. Discharge to Home. Condition at Discharge:  stable  2. Physical activity: Regular.  3. Diet:  Regular.  4. Contraception plan: undecided, will follow up at postpartum visit.  5. Follow up with Miley Denney in 4 weeks.    Discharge Medications:   Current Discharge Medication List      CONTINUE these medications which have NOT CHANGED    Details   balsalazide (COLAZAL) 750 MG capsule Take 750 mg by mouth 3 times daily      Prenatal Vit-Fe Sulfate-FA (PRENATAL MULTIVIT-IRON PO) Take 1 tablet by mouth             Allergies: No Known Allergies    Discharge Exam:  /82 (BP Location: Right arm, Patient Position: Semi-Sosa's, Cuff Size: Adult Small)   Pulse 69   Temp 97.9  F (36.6  C) (Oral)   Resp 16   Ht 1.613 m (5' 3.5\")   Wt 74.4 kg (164 lb)   " SpO2 97%   Breastfeeding Unknown   BMI 28.60 kg/m    General - alert, comfortable  Heart - RRR, no murmurs  Lungs - CTA bilaterally  Abdomen - fundus firm, nontender, below umbilicus  Extremities - trace edema    Post-partum hemoglobin:   Hemoglobin   Date Value Ref Range Status   01/15/2023 10.2 (L) 11.7 - 15.7 g/dL Final         Completed by:   Major Bentley MD  [facility]  1/16/2023 7:58 AM

## 2023-01-16 NOTE — PLAN OF CARE
Blanca's VSS.  She's independent with self and baby cares.  Breastfeeding is going well.  She is able to help baby get a deep latch; many swallows heard and baby is satisfied after feeding.  He nipples are intact.  Blanca is taking Tylenol and Ibuprofen for perineal pain.  She was encouraged to sit a warm tub again as well.

## 2023-02-12 ENCOUNTER — HEALTH MAINTENANCE LETTER (OUTPATIENT)
Age: 33
End: 2023-02-12

## 2024-02-12 ENCOUNTER — LAB REQUISITION (OUTPATIENT)
Dept: LAB | Facility: CLINIC | Age: 34
End: 2024-02-12

## 2024-02-12 ENCOUNTER — TRANSFERRED RECORDS (OUTPATIENT)
Dept: HEALTH INFORMATION MANAGEMENT | Facility: CLINIC | Age: 34
End: 2024-02-12

## 2024-02-12 DIAGNOSIS — O09.521 SUPERVISION OF ELDERLY MULTIGRAVIDA, FIRST TRIMESTER: ICD-10-CM

## 2024-02-12 LAB
BASOPHILS # BLD AUTO: 0.1 10E3/UL (ref 0–0.2)
BASOPHILS NFR BLD AUTO: 1 %
EOSINOPHIL # BLD AUTO: 0.1 10E3/UL (ref 0–0.7)
EOSINOPHIL NFR BLD AUTO: 3 %
ERYTHROCYTE [DISTWIDTH] IN BLOOD BY AUTOMATED COUNT: 12.1 % (ref 10–15)
HCT VFR BLD AUTO: 36.8 % (ref 35–47)
HGB BLD-MCNC: 12.4 G/DL (ref 11.7–15.7)
IMM GRANULOCYTES # BLD: 0 10E3/UL
IMM GRANULOCYTES NFR BLD: 0 %
LYMPHOCYTES # BLD AUTO: 1.3 10E3/UL (ref 0.8–5.3)
LYMPHOCYTES NFR BLD AUTO: 23 %
MCH RBC QN AUTO: 29.2 PG (ref 26.5–33)
MCHC RBC AUTO-ENTMCNC: 33.7 G/DL (ref 31.5–36.5)
MCV RBC AUTO: 87 FL (ref 78–100)
MONOCYTES # BLD AUTO: 0.4 10E3/UL (ref 0–1.3)
MONOCYTES NFR BLD AUTO: 7 %
NEUTROPHILS # BLD AUTO: 3.7 10E3/UL (ref 1.6–8.3)
NEUTROPHILS NFR BLD AUTO: 66 %
NRBC # BLD AUTO: 0 10E3/UL
NRBC BLD AUTO-RTO: 0 /100
PLATELET # BLD AUTO: 266 10E3/UL (ref 150–450)
RBC # BLD AUTO: 4.24 10E6/UL (ref 3.8–5.2)
WBC # BLD AUTO: 5.6 10E3/UL (ref 4–11)

## 2024-02-12 PROCEDURE — 87086 URINE CULTURE/COLONY COUNT: CPT | Performed by: FAMILY MEDICINE

## 2024-02-12 PROCEDURE — 86780 TREPONEMA PALLIDUM: CPT | Performed by: FAMILY MEDICINE

## 2024-02-12 PROCEDURE — 86803 HEPATITIS C AB TEST: CPT | Performed by: FAMILY MEDICINE

## 2024-02-12 PROCEDURE — 86900 BLOOD TYPING SEROLOGIC ABO: CPT | Performed by: FAMILY MEDICINE

## 2024-02-12 PROCEDURE — 87340 HEPATITIS B SURFACE AG IA: CPT | Performed by: FAMILY MEDICINE

## 2024-02-12 PROCEDURE — 87491 CHLMYD TRACH DNA AMP PROBE: CPT | Performed by: FAMILY MEDICINE

## 2024-02-12 PROCEDURE — 85025 COMPLETE CBC W/AUTO DIFF WBC: CPT | Performed by: FAMILY MEDICINE

## 2024-02-12 PROCEDURE — 86762 RUBELLA ANTIBODY: CPT | Performed by: FAMILY MEDICINE

## 2024-02-12 PROCEDURE — 87389 HIV-1 AG W/HIV-1&-2 AB AG IA: CPT | Performed by: FAMILY MEDICINE

## 2024-02-13 LAB
ABO/RH(D): NORMAL
ANTIBODY SCREEN: NEGATIVE
C TRACH DNA SPEC QL PROBE+SIG AMP: NEGATIVE
HBV SURFACE AG SERPL QL IA: NONREACTIVE
HCV AB SERPL QL IA: NONREACTIVE
HIV 1+2 AB+HIV1 P24 AG SERPL QL IA: NONREACTIVE
N GONORRHOEA DNA SPEC QL NAA+PROBE: NEGATIVE
RUBV IGG SERPL QL IA: 1.75 INDEX
RUBV IGG SERPL QL IA: POSITIVE
SPECIMEN EXPIRATION DATE: NORMAL
T PALLIDUM AB SER QL: NONREACTIVE

## 2024-02-14 LAB — BACTERIA UR CULT: NORMAL

## 2024-03-10 ENCOUNTER — HEALTH MAINTENANCE LETTER (OUTPATIENT)
Age: 34
End: 2024-03-10

## 2024-06-05 ENCOUNTER — LAB REQUISITION (OUTPATIENT)
Dept: LAB | Facility: CLINIC | Age: 34
End: 2024-06-05

## 2024-06-05 DIAGNOSIS — O09.522 SUPERVISION OF ELDERLY MULTIGRAVIDA, SECOND TRIMESTER: ICD-10-CM

## 2024-06-05 PROCEDURE — 86780 TREPONEMA PALLIDUM: CPT | Performed by: FAMILY MEDICINE

## 2024-06-06 LAB — T PALLIDUM AB SER QL: NONREACTIVE

## 2024-08-14 ENCOUNTER — LAB REQUISITION (OUTPATIENT)
Dept: LAB | Facility: CLINIC | Age: 34
End: 2024-08-14

## 2024-08-14 DIAGNOSIS — O09.523 SUPERVISION OF ELDERLY MULTIGRAVIDA, THIRD TRIMESTER: ICD-10-CM

## 2024-08-14 PROCEDURE — 87653 STREP B DNA AMP PROBE: CPT | Performed by: FAMILY MEDICINE

## 2024-08-15 LAB — GP B STREP DNA SPEC QL NAA+PROBE: NEGATIVE

## 2024-09-04 ENCOUNTER — TRANSFERRED RECORDS (OUTPATIENT)
Dept: HEALTH INFORMATION MANAGEMENT | Facility: CLINIC | Age: 34
End: 2024-09-04
Payer: COMMERCIAL

## 2024-09-13 ENCOUNTER — HOSPITAL ENCOUNTER (INPATIENT)
Facility: HOSPITAL | Age: 34
LOS: 3 days | Discharge: HOME HEALTH | End: 2024-09-16
Attending: FAMILY MEDICINE | Admitting: FAMILY MEDICINE
Payer: COMMERCIAL

## 2024-09-13 ENCOUNTER — ANESTHESIA EVENT (OUTPATIENT)
Dept: OBGYN | Facility: HOSPITAL | Age: 34
End: 2024-09-13
Payer: COMMERCIAL

## 2024-09-13 ENCOUNTER — ANESTHESIA (OUTPATIENT)
Dept: OBGYN | Facility: HOSPITAL | Age: 34
End: 2024-09-13
Payer: COMMERCIAL

## 2024-09-13 DIAGNOSIS — Z98.891 S/P EMERGENCY CESAREAN SECTION: Primary | ICD-10-CM

## 2024-09-13 PROBLEM — Z34.90 ENCOUNTER FOR INDUCTION OF LABOR: Status: ACTIVE | Noted: 2024-09-13

## 2024-09-13 LAB
ABO/RH(D): NORMAL
ANTIBODY SCREEN: NEGATIVE
ERYTHROCYTE [DISTWIDTH] IN BLOOD BY AUTOMATED COUNT: 14.2 % (ref 10–15)
HCT VFR BLD AUTO: 30.3 % (ref 35–47)
HGB BLD-MCNC: 9.5 G/DL (ref 11.7–15.7)
HGB BLD-MCNC: 9.6 G/DL (ref 11.7–15.7)
MCH RBC QN AUTO: 25.8 PG (ref 26.5–33)
MCHC RBC AUTO-ENTMCNC: 31.4 G/DL (ref 31.5–36.5)
MCV RBC AUTO: 82 FL (ref 78–100)
PLATELET # BLD AUTO: 226 10E3/UL (ref 150–450)
RBC # BLD AUTO: 3.68 10E6/UL (ref 3.8–5.2)
SPECIMEN EXPIRATION DATE: NORMAL
T PALLIDUM AB SER QL: NONREACTIVE
WBC # BLD AUTO: 5.7 10E3/UL (ref 4–11)

## 2024-09-13 PROCEDURE — 272N000001 HC OR GENERAL SUPPLY STERILE: Performed by: OBSTETRICS & GYNECOLOGY

## 2024-09-13 PROCEDURE — 258N000003 HC RX IP 258 OP 636: Performed by: FAMILY MEDICINE

## 2024-09-13 PROCEDURE — 710N000009 HC RECOVERY PHASE 1, LEVEL 1, PER MIN: Performed by: OBSTETRICS & GYNECOLOGY

## 2024-09-13 PROCEDURE — 250N000011 HC RX IP 250 OP 636: Performed by: ANESTHESIOLOGY

## 2024-09-13 PROCEDURE — 999N000016 HC STATISTIC ATTENDANCE AT DELIVERY

## 2024-09-13 PROCEDURE — 85018 HEMOGLOBIN: CPT | Performed by: FAMILY MEDICINE

## 2024-09-13 PROCEDURE — 250N000009 HC RX 250: Performed by: NURSE ANESTHETIST, CERTIFIED REGISTERED

## 2024-09-13 PROCEDURE — 36415 COLL VENOUS BLD VENIPUNCTURE: CPT | Performed by: FAMILY MEDICINE

## 2024-09-13 PROCEDURE — 86780 TREPONEMA PALLIDUM: CPT | Performed by: FAMILY MEDICINE

## 2024-09-13 PROCEDURE — 999N000249 HC STATISTIC C-SECTION ON UNIT: Performed by: OBSTETRICS & GYNECOLOGY

## 2024-09-13 PROCEDURE — 360N000076 HC SURGERY LEVEL 3, PER MIN: Performed by: OBSTETRICS & GYNECOLOGY

## 2024-09-13 PROCEDURE — 59510 CESAREAN DELIVERY: CPT | Performed by: ANESTHESIOLOGY

## 2024-09-13 PROCEDURE — 370N000017 HC ANESTHESIA TECHNICAL FEE, PER MIN: Performed by: OBSTETRICS & GYNECOLOGY

## 2024-09-13 PROCEDURE — 85027 COMPLETE CBC AUTOMATED: CPT | Performed by: NURSE ANESTHETIST, CERTIFIED REGISTERED

## 2024-09-13 PROCEDURE — 258N000003 HC RX IP 258 OP 636: Performed by: NURSE ANESTHETIST, CERTIFIED REGISTERED

## 2024-09-13 PROCEDURE — 250N000011 HC RX IP 250 OP 636: Performed by: NURSE ANESTHETIST, CERTIFIED REGISTERED

## 2024-09-13 PROCEDURE — 250N000009 HC RX 250: Performed by: FAMILY MEDICINE

## 2024-09-13 PROCEDURE — 59510 CESAREAN DELIVERY: CPT | Performed by: NURSE ANESTHETIST, CERTIFIED REGISTERED

## 2024-09-13 PROCEDURE — 86900 BLOOD TYPING SEROLOGIC ABO: CPT | Performed by: FAMILY MEDICINE

## 2024-09-13 PROCEDURE — 250N000009 HC RX 250: Performed by: ANESTHESIOLOGY

## 2024-09-13 PROCEDURE — 250N000025 HC SEVOFLURANE, PER MIN: Performed by: OBSTETRICS & GYNECOLOGY

## 2024-09-13 PROCEDURE — 3E033VJ INTRODUCTION OF OTHER HORMONE INTO PERIPHERAL VEIN, PERCUTANEOUS APPROACH: ICD-10-PCS | Performed by: FAMILY MEDICINE

## 2024-09-13 PROCEDURE — 120N000001 HC R&B MED SURG/OB

## 2024-09-13 RX ORDER — LIDOCAINE HYDROCHLORIDE AND EPINEPHRINE 15; 5 MG/ML; UG/ML
INJECTION, SOLUTION EPIDURAL PRN
Status: DISCONTINUED | OUTPATIENT
Start: 2024-09-13 | End: 2024-09-14

## 2024-09-13 RX ORDER — ACETAMINOPHEN 325 MG/1
975 TABLET ORAL ONCE
Status: DISCONTINUED | OUTPATIENT
Start: 2024-09-13 | End: 2024-09-13 | Stop reason: HOSPADM

## 2024-09-13 RX ORDER — METHYLERGONOVINE MALEATE 0.2 MG/ML
200 INJECTION INTRAVENOUS
Status: DISCONTINUED | OUTPATIENT
Start: 2024-09-13 | End: 2024-09-14 | Stop reason: HOSPADM

## 2024-09-13 RX ORDER — IBUPROFEN 800 MG/1
800 TABLET, FILM COATED ORAL
Status: DISCONTINUED | OUTPATIENT
Start: 2024-09-13 | End: 2024-09-16 | Stop reason: HOSPADM

## 2024-09-13 RX ORDER — NALOXONE HYDROCHLORIDE 0.4 MG/ML
0.4 INJECTION, SOLUTION INTRAMUSCULAR; INTRAVENOUS; SUBCUTANEOUS
Status: DISCONTINUED | OUTPATIENT
Start: 2024-09-13 | End: 2024-09-14 | Stop reason: HOSPADM

## 2024-09-13 RX ORDER — CARBOPROST TROMETHAMINE 250 UG/ML
250 INJECTION, SOLUTION INTRAMUSCULAR
Status: DISCONTINUED | OUTPATIENT
Start: 2024-09-13 | End: 2024-09-14 | Stop reason: HOSPADM

## 2024-09-13 RX ORDER — LIDOCAINE 40 MG/G
CREAM TOPICAL
Status: DISCONTINUED | OUTPATIENT
Start: 2024-09-13 | End: 2024-09-13 | Stop reason: HOSPADM

## 2024-09-13 RX ORDER — NALBUPHINE HYDROCHLORIDE 20 MG/ML
2.5-5 INJECTION, SOLUTION INTRAMUSCULAR; INTRAVENOUS; SUBCUTANEOUS EVERY 6 HOURS PRN
Status: DISCONTINUED | OUTPATIENT
Start: 2024-09-13 | End: 2024-09-16 | Stop reason: HOSPADM

## 2024-09-13 RX ORDER — EPHEDRINE SULFATE 50 MG/ML
5 INJECTION, SOLUTION INTRAMUSCULAR; INTRAVENOUS; SUBCUTANEOUS
Status: DISCONTINUED | OUTPATIENT
Start: 2024-09-13 | End: 2024-09-14 | Stop reason: HOSPADM

## 2024-09-13 RX ORDER — FENTANYL CITRATE 50 UG/ML
INJECTION, SOLUTION INTRAMUSCULAR; INTRAVENOUS PRN
Status: DISCONTINUED | OUTPATIENT
Start: 2024-09-13 | End: 2024-09-14

## 2024-09-13 RX ORDER — OXYTOCIN/0.9 % SODIUM CHLORIDE 30/500 ML
100-340 PLASTIC BAG, INJECTION (ML) INTRAVENOUS CONTINUOUS PRN
Status: DISCONTINUED | OUTPATIENT
Start: 2024-09-13 | End: 2024-09-16 | Stop reason: HOSPADM

## 2024-09-13 RX ORDER — OXYTOCIN/0.9 % SODIUM CHLORIDE 30/500 ML
PLASTIC BAG, INJECTION (ML) INTRAVENOUS
Status: COMPLETED
Start: 2024-09-13 | End: 2024-09-13

## 2024-09-13 RX ORDER — ONDANSETRON 4 MG/1
4 TABLET, ORALLY DISINTEGRATING ORAL EVERY 6 HOURS PRN
Status: DISCONTINUED | OUTPATIENT
Start: 2024-09-13 | End: 2024-09-13

## 2024-09-13 RX ORDER — MISOPROSTOL 200 UG/1
800 TABLET ORAL
Status: DISCONTINUED | OUTPATIENT
Start: 2024-09-13 | End: 2024-09-14 | Stop reason: HOSPADM

## 2024-09-13 RX ORDER — MORPHINE SULFATE 0.5 MG/ML
2 INJECTION, SOLUTION EPIDURAL; INTRATHECAL; INTRAVENOUS ONCE
Status: DISCONTINUED | OUTPATIENT
Start: 2024-09-13 | End: 2024-09-14 | Stop reason: HOSPADM

## 2024-09-13 RX ORDER — METOCLOPRAMIDE HYDROCHLORIDE 5 MG/ML
10 INJECTION INTRAMUSCULAR; INTRAVENOUS EVERY 6 HOURS PRN
Status: DISCONTINUED | OUTPATIENT
Start: 2024-09-13 | End: 2024-09-13

## 2024-09-13 RX ORDER — LIDOCAINE 40 MG/G
CREAM TOPICAL
Status: DISCONTINUED | OUTPATIENT
Start: 2024-09-13 | End: 2024-09-14 | Stop reason: HOSPADM

## 2024-09-13 RX ORDER — OXYTOCIN/0.9 % SODIUM CHLORIDE 30/500 ML
340 PLASTIC BAG, INJECTION (ML) INTRAVENOUS CONTINUOUS PRN
Status: DISCONTINUED | OUTPATIENT
Start: 2024-09-13 | End: 2024-09-14 | Stop reason: HOSPADM

## 2024-09-13 RX ORDER — OXYTOCIN 10 [USP'U]/ML
10 INJECTION, SOLUTION INTRAMUSCULAR; INTRAVENOUS
Status: DISCONTINUED | OUTPATIENT
Start: 2024-09-13 | End: 2024-09-16 | Stop reason: HOSPADM

## 2024-09-13 RX ORDER — PROCHLORPERAZINE MALEATE 10 MG
10 TABLET ORAL EVERY 6 HOURS PRN
Status: DISCONTINUED | OUTPATIENT
Start: 2024-09-13 | End: 2024-09-13

## 2024-09-13 RX ORDER — SODIUM CHLORIDE, SODIUM LACTATE, POTASSIUM CHLORIDE, CALCIUM CHLORIDE 600; 310; 30; 20 MG/100ML; MG/100ML; MG/100ML; MG/100ML
INJECTION, SOLUTION INTRAVENOUS CONTINUOUS PRN
Status: DISCONTINUED | OUTPATIENT
Start: 2024-09-13 | End: 2024-09-14 | Stop reason: HOSPADM

## 2024-09-13 RX ORDER — DEXAMETHASONE SODIUM PHOSPHATE 4 MG/ML
INJECTION, SOLUTION INTRA-ARTICULAR; INTRALESIONAL; INTRAMUSCULAR; INTRAVENOUS; SOFT TISSUE PRN
Status: DISCONTINUED | OUTPATIENT
Start: 2024-09-13 | End: 2024-09-14

## 2024-09-13 RX ORDER — METOCLOPRAMIDE HYDROCHLORIDE 5 MG/ML
10 INJECTION INTRAMUSCULAR; INTRAVENOUS EVERY 6 HOURS PRN
Status: DISCONTINUED | OUTPATIENT
Start: 2024-09-13 | End: 2024-09-14 | Stop reason: HOSPADM

## 2024-09-13 RX ORDER — OXYTOCIN 10 [USP'U]/ML
10 INJECTION, SOLUTION INTRAMUSCULAR; INTRAVENOUS
Status: DISCONTINUED | OUTPATIENT
Start: 2024-09-13 | End: 2024-09-14 | Stop reason: HOSPADM

## 2024-09-13 RX ORDER — ONDANSETRON 2 MG/ML
4 INJECTION INTRAMUSCULAR; INTRAVENOUS EVERY 6 HOURS PRN
Status: DISCONTINUED | OUTPATIENT
Start: 2024-09-13 | End: 2024-09-14 | Stop reason: HOSPADM

## 2024-09-13 RX ORDER — LOPERAMIDE HCL 2 MG
4 CAPSULE ORAL
Status: DISCONTINUED | OUTPATIENT
Start: 2024-09-13 | End: 2024-09-14 | Stop reason: HOSPADM

## 2024-09-13 RX ORDER — METOCLOPRAMIDE 10 MG/1
10 TABLET ORAL EVERY 6 HOURS PRN
Status: DISCONTINUED | OUTPATIENT
Start: 2024-09-13 | End: 2024-09-14 | Stop reason: HOSPADM

## 2024-09-13 RX ORDER — SODIUM CHLORIDE, SODIUM LACTATE, POTASSIUM CHLORIDE, CALCIUM CHLORIDE 600; 310; 30; 20 MG/100ML; MG/100ML; MG/100ML; MG/100ML
INJECTION, SOLUTION INTRAVENOUS CONTINUOUS
Status: DISCONTINUED | OUTPATIENT
Start: 2024-09-13 | End: 2024-09-13 | Stop reason: HOSPADM

## 2024-09-13 RX ORDER — KETOROLAC TROMETHAMINE 30 MG/ML
30 INJECTION, SOLUTION INTRAMUSCULAR; INTRAVENOUS
Status: DISCONTINUED | OUTPATIENT
Start: 2024-09-13 | End: 2024-09-16 | Stop reason: HOSPADM

## 2024-09-13 RX ORDER — FENTANYL CITRATE-0.9 % NACL/PF 10 MCG/ML
100 PLASTIC BAG, INJECTION (ML) INTRAVENOUS EVERY 5 MIN PRN
Status: DISCONTINUED | OUTPATIENT
Start: 2024-09-13 | End: 2024-09-14 | Stop reason: HOSPADM

## 2024-09-13 RX ORDER — ONDANSETRON 2 MG/ML
4 INJECTION INTRAMUSCULAR; INTRAVENOUS EVERY 6 HOURS PRN
Status: DISCONTINUED | OUTPATIENT
Start: 2024-09-13 | End: 2024-09-13

## 2024-09-13 RX ORDER — PROCHLORPERAZINE MALEATE 10 MG
10 TABLET ORAL EVERY 6 HOURS PRN
Status: DISCONTINUED | OUTPATIENT
Start: 2024-09-13 | End: 2024-09-14 | Stop reason: HOSPADM

## 2024-09-13 RX ORDER — MORPHINE SULFATE 1 MG/ML
INJECTION, SOLUTION EPIDURAL; INTRATHECAL; INTRAVENOUS PRN
Status: DISCONTINUED | OUTPATIENT
Start: 2024-09-13 | End: 2024-09-14

## 2024-09-13 RX ORDER — PROCHLORPERAZINE 25 MG
25 SUPPOSITORY, RECTAL RECTAL EVERY 12 HOURS PRN
Status: DISCONTINUED | OUTPATIENT
Start: 2024-09-13 | End: 2024-09-14 | Stop reason: HOSPADM

## 2024-09-13 RX ORDER — OXYTOCIN/0.9 % SODIUM CHLORIDE 30/500 ML
1-24 PLASTIC BAG, INJECTION (ML) INTRAVENOUS CONTINUOUS
Status: DISCONTINUED | OUTPATIENT
Start: 2024-09-13 | End: 2024-09-14 | Stop reason: HOSPADM

## 2024-09-13 RX ORDER — PROCHLORPERAZINE 25 MG
25 SUPPOSITORY, RECTAL RECTAL EVERY 12 HOURS PRN
Status: DISCONTINUED | OUTPATIENT
Start: 2024-09-13 | End: 2024-09-13

## 2024-09-13 RX ORDER — FENTANYL CITRATE 50 UG/ML
50 INJECTION, SOLUTION INTRAMUSCULAR; INTRAVENOUS EVERY 30 MIN PRN
Status: DISCONTINUED | OUTPATIENT
Start: 2024-09-13 | End: 2024-09-14 | Stop reason: HOSPADM

## 2024-09-13 RX ORDER — PROPOFOL 10 MG/ML
INJECTION, EMULSION INTRAVENOUS PRN
Status: DISCONTINUED | OUTPATIENT
Start: 2024-09-13 | End: 2024-09-14

## 2024-09-13 RX ORDER — CEFAZOLIN SODIUM/WATER 2 G/20 ML
SYRINGE (ML) INTRAVENOUS PRN
Status: DISCONTINUED | OUTPATIENT
Start: 2024-09-13 | End: 2024-09-14

## 2024-09-13 RX ORDER — BUPIVACAINE HYDROCHLORIDE 2.5 MG/ML
INJECTION, SOLUTION EPIDURAL; INFILTRATION; INTRACAUDAL
Status: COMPLETED | OUTPATIENT
Start: 2024-09-13 | End: 2024-09-13

## 2024-09-13 RX ORDER — MISOPROSTOL 200 UG/1
400 TABLET ORAL
Status: DISCONTINUED | OUTPATIENT
Start: 2024-09-13 | End: 2024-09-14 | Stop reason: HOSPADM

## 2024-09-13 RX ORDER — METOCLOPRAMIDE 10 MG/1
10 TABLET ORAL EVERY 6 HOURS PRN
Status: DISCONTINUED | OUTPATIENT
Start: 2024-09-13 | End: 2024-09-13

## 2024-09-13 RX ORDER — LOPERAMIDE HCL 2 MG
2 CAPSULE ORAL
Status: DISCONTINUED | OUTPATIENT
Start: 2024-09-13 | End: 2024-09-14 | Stop reason: HOSPADM

## 2024-09-13 RX ORDER — ONDANSETRON 4 MG/1
4 TABLET, ORALLY DISINTEGRATING ORAL EVERY 6 HOURS PRN
Status: DISCONTINUED | OUTPATIENT
Start: 2024-09-13 | End: 2024-09-14 | Stop reason: HOSPADM

## 2024-09-13 RX ORDER — CITRIC ACID/SODIUM CITRATE 334-500MG
30 SOLUTION, ORAL ORAL
Status: DISCONTINUED | OUTPATIENT
Start: 2024-09-13 | End: 2024-09-14 | Stop reason: HOSPADM

## 2024-09-13 RX ORDER — NALOXONE HYDROCHLORIDE 0.4 MG/ML
0.2 INJECTION, SOLUTION INTRAMUSCULAR; INTRAVENOUS; SUBCUTANEOUS
Status: DISCONTINUED | OUTPATIENT
Start: 2024-09-13 | End: 2024-09-14 | Stop reason: HOSPADM

## 2024-09-13 RX ORDER — FENTANYL/ROPIVACAINE/NS/PF 2MCG/ML-.1
PLASTIC BAG, INJECTION (ML) EPIDURAL
Status: DISCONTINUED | OUTPATIENT
Start: 2024-09-13 | End: 2024-09-14 | Stop reason: HOSPADM

## 2024-09-13 RX ORDER — TRANEXAMIC ACID 10 MG/ML
1 INJECTION, SOLUTION INTRAVENOUS EVERY 30 MIN PRN
Status: DISCONTINUED | OUTPATIENT
Start: 2024-09-13 | End: 2024-09-14 | Stop reason: HOSPADM

## 2024-09-13 RX ORDER — LIDOCAINE HYDROCHLORIDE 10 MG/ML
INJECTION, SOLUTION EPIDURAL; INFILTRATION; INTRACAUDAL; PERINEURAL PRN
Status: DISCONTINUED | OUTPATIENT
Start: 2024-09-13 | End: 2024-09-14

## 2024-09-13 RX ADMIN — SODIUM CHLORIDE, POTASSIUM CHLORIDE, SODIUM LACTATE AND CALCIUM CHLORIDE: 600; 310; 30; 20 INJECTION, SOLUTION INTRAVENOUS at 19:46

## 2024-09-13 RX ADMIN — PROPOFOL 200 MG: 10 INJECTION, EMULSION INTRAVENOUS at 22:50

## 2024-09-13 RX ADMIN — Medication 2 MG: at 23:06

## 2024-09-13 RX ADMIN — PHENYLEPHRINE HYDROCHLORIDE 0.4 MCG/KG/MIN: 10 INJECTION INTRAVENOUS at 23:20

## 2024-09-13 RX ADMIN — DEXMEDETOMIDINE HYDROCHLORIDE 16 MCG: 100 INJECTION, SOLUTION INTRAVENOUS at 23:42

## 2024-09-13 RX ADMIN — LIDOCAINE HYDROCHLORIDE 3 ML: 10 INJECTION, SOLUTION EPIDURAL; INFILTRATION; INTRACAUDAL; PERINEURAL at 18:03

## 2024-09-13 RX ADMIN — Medication: at 18:07

## 2024-09-13 RX ADMIN — Medication 100 MG: at 22:50

## 2024-09-13 RX ADMIN — ONDANSETRON 4 MG: 2 INJECTION INTRAMUSCULAR; INTRAVENOUS at 23:06

## 2024-09-13 RX ADMIN — PHENYLEPHRINE HYDROCHLORIDE 200 MCG: 10 INJECTION INTRAVENOUS at 22:58

## 2024-09-13 RX ADMIN — SODIUM CHLORIDE, POTASSIUM CHLORIDE, SODIUM LACTATE AND CALCIUM CHLORIDE 25 ML/HR: 600; 310; 30; 20 INJECTION, SOLUTION INTRAVENOUS at 10:46

## 2024-09-13 RX ADMIN — FENTANYL CITRATE 100 MCG: 50 INJECTION INTRAMUSCULAR; INTRAVENOUS at 22:54

## 2024-09-13 RX ADMIN — DEXAMETHASONE SODIUM PHOSPHATE 10 MG: 4 INJECTION, SOLUTION INTRA-ARTICULAR; INTRALESIONAL; INTRAMUSCULAR; INTRAVENOUS; SOFT TISSUE at 23:06

## 2024-09-13 RX ADMIN — PHENYLEPHRINE HYDROCHLORIDE 200 MCG: 10 INJECTION INTRAVENOUS at 23:06

## 2024-09-13 RX ADMIN — Medication 2 G: at 22:52

## 2024-09-13 RX ADMIN — Medication 2 MILLI-UNITS/MIN: at 10:47

## 2024-09-13 RX ADMIN — PHENYLEPHRINE HYDROCHLORIDE 200 MCG: 10 INJECTION INTRAVENOUS at 23:01

## 2024-09-13 RX ADMIN — PHENYLEPHRINE HYDROCHLORIDE 200 MCG: 10 INJECTION INTRAVENOUS at 23:19

## 2024-09-13 RX ADMIN — BUPIVACAINE HYDROCHLORIDE 5 ML: 2.5 INJECTION, SOLUTION EPIDURAL; INFILTRATION; INTRACAUDAL at 18:12

## 2024-09-13 RX ADMIN — BUPIVACAINE HYDROCHLORIDE 8 ML: 2.5 INJECTION, SOLUTION EPIDURAL; INFILTRATION; INTRACAUDAL at 23:38

## 2024-09-13 RX ADMIN — LIDOCAINE HYDROCHLORIDE,EPINEPHRINE BITARTRATE 3 ML: 15; .005 INJECTION, SOLUTION EPIDURAL; INFILTRATION; INTRACAUDAL; PERINEURAL at 18:07

## 2024-09-13 ASSESSMENT — ACTIVITIES OF DAILY LIVING (ADL)
ADLS_ACUITY_SCORE: 18
ADLS_ACUITY_SCORE: 35
ADLS_ACUITY_SCORE: 18

## 2024-09-13 NOTE — PROVIDER NOTIFICATION
09/13/24 1527   Provider Notification   Provider Name/Title dr. abebe   Method of Notification Phone   Request Evaluate - Remote   Notification Reason Labor Status;Uterine Activity;Pain       RN notified MD that Blanca started to pause her conversation and breathe thru contractions at 1445. Pit is at 10 and contractions are every 1-2.5 minutes. Orders to half the pit if needed for tachysystole. Efm is category I    Okay to hold SVE until after an epidural when the time comes

## 2024-09-13 NOTE — PROGRESS NOTES
Bedside nurse contacted me to verify fetal position. Using bedside US, vertex position was confirmed.     Erna Rebollar, PGY-1  [facility]

## 2024-09-13 NOTE — PROGRESS NOTES
Data: Patient presented to [facility]: 2024  9:44 AM.  Blanca and her family brought to room #6 and orientated to room and call light. Patient admitted for induction for postdates. Patient is a .  Prenatal record reviewed. Pregnancy has been uncomplicated..  Gestational Age 41w2d. VSS. Fetal movement present. Patient denies uterine contractions, leaking of vaginal fluid/rupture of membranes, vaginal bleeding, abdominal pain. Support person is present.   Action: Verbal consent for EFM.  Admission assessment completed. Bill of rights reviewed.  Response: Patient verbalized agreement with plan. Will contact Dr Miley Corrales with update and further orders.   RN notified Dr. Corrales of admission, EFM, SVE, and uterine contraction status.   Orders for admission and pit.

## 2024-09-13 NOTE — H&P
Maternal Admission H&P  [facility]  Date of Admission: 2024  Date of Service: 2024    Name      Blanca Delong         1990  MRN       [id number]  PCP        Miley Corrales         Assessment and Plan  34 year old  at 41w2d presents for Postdates Induction.  Anticipate .  Analgesia per patient's wishes  Group B strep: negative        Chief Complaint  postdates    HPI  Blanca Delong is a 34 year old woman at 41w2d, based on an Estimated Date of Delivery: Sep 4, 2024. She presents with plan for induction as postdates after uncomplicated pregnancy.    Patient Active Problem List    Diagnosis Date Noted    Encounter for induction of labor 2024     Priority: Medium    Vacuum-assisted vaginal delivery 2023     Priority: Medium      OB History    Para Term  AB Living   2 1 1 0 0 1   SAB IAB Ectopic Multiple Live Births   0 0 0 0 1      # Outcome Date GA Lbr Kamaljit/2nd Weight Sex Type Anes PTL Lv   2 Current            1 Term 23 40w5d 25:00 / 00:39 3.59 kg (7 lb 14.6 oz) M Vag-Vacuum EPI N LYDIA      Birth Comments: cuput and vac marks on left occiput      Complications: Fetal Intolerance      Name: SRINIVASA DELONG      Apgar1: 8  Apgar5: 9       Review of Systems   Negative except what is noted in HPI.     Past Medical History  Past Medical History:   Diagnosis Date    Ulcerative colitis (H)     Uncomplicated asthma         Past Surgical History  History reviewed. No pertinent surgical history.    Allergies  Patient has no known allergies.    Family History  History reviewed. No pertinent family history.    Social History  I have reviewed this patient's social history and updated it with pertinent information if needed. Blanca Delong  reports that she has never smoked. She has never been exposed to tobacco smoke. She has never used smokeless tobacco. She reports that she does not  "currently use alcohol. She reports that she does not use drugs.    Prior to Admission Medication List  Medications Prior to Admission   Medication Sig Dispense Refill Last Dose    balsalazide (COLAZAL) 750 MG capsule Take 750 mg by mouth 2 times daily.   Past Week    Prenatal Vit-Fe Sulfate-FA (PRENATAL MULTIVIT-IRON PO) Take 1 tablet by mouth   9/12/2024    acetaminophen (TYLENOL) 325 MG tablet Take 2 tablets (650 mg) by mouth every 4 hours as needed for mild pain or fever (greater than or equal to 38  C /100.4  F (oral) or 38.5  C/ 101.4  F (core).)           Allergies  No Known Allergies  Immunization History   Administered Date(s) Administered    COVID-19 MONOVALENT 12+ (Pfizer) 04/27/2021, 05/18/2021    TDAP (Adacel,Boostrix) 11/11/2022        Physical Exam  /64   Pulse 77   Temp 98  F (36.7  C)   Resp 18   Ht 1.6 m (5' 3\")   Wt 76.7 kg (169 lb)   SpO2 97%   BMI 29.94 kg/m    HEART: RRR, no murmur  LUNGS: CTA bilaterally  Fetal Heart Tones: 150's baseline, moderate variablility, + accels, no decels, and Category I  CONTRACTIONS:  frequency q 1-3 minutes  CERVIX: 3/70/-2 RN exam this am  FLUID: None noted.  Fetal Presentation: vertex.    Labs  Group B Strep PCR   Date Value Ref Range Status   08/14/2024 Negative Negative Final     Comment:     Presumed negative for Streptococcus agalactiae (Group B Streptococcus) or the number of organisms may be below the limit of detection of the assay.      Antibody Screen   Date Value Ref Range Status   09/13/2024 Negative Negative Final     Hepatitis B Surface Antigen   Date Value Ref Range Status   02/12/2024 Nonreactive Nonreactive Final     Hemoglobin   Date Value Ref Range Status   09/13/2024 9.6 (L) 11.7 - 15.7 g/dL Final      Admission on 09/13/2024   Component Date Value Ref Range Status    Hemoglobin 09/13/2024 9.6 (L)  11.7 - 15.7 g/dL Final    ABO/RH(D) 09/13/2024 A POS   Final    Antibody Screen 09/13/2024 Negative  Negative Final    SPECIMEN " EXPIRATION DATE 09/13/2024 [id number]   Final        Completed by:   Miley Corrales MD  [facility]  9/13/2024 3:28 PM

## 2024-09-13 NOTE — PLAN OF CARE
Problem: Labor  Goal: Effective Progression to Delivery  Outcome: Progressing   Goal Outcome Evaluation:      Plan of Care Reviewed With: patient, spouse    Overall Patient Progress: improvingOverall Patient Progress: improving    Outcome Evaluation: admitted for IOL    Pitocin started per order      Problem: Labor  Goal: Acceptable Pain Control  Outcome: Progressing     Blanca is coping well at this time      Problem: Labor  Goal: Normal Uterine Contraction Pattern  Outcome: Progressing     No contractions at this time    Plan for

## 2024-09-13 NOTE — PLAN OF CARE
Problem: Labor  Goal: Stable Fetal Wellbeing  2024 by Alena Collado RN  Outcome: Progressing  2024 1140 by Alena Collado RN  Outcome: Progressing   Goal Outcome Evaluation:    EFM has been category I      Problem: Labor  Goal: Normal Uterine Contraction Pattern  2024 by Alena Collado, RN  Outcome: Progressing  2024 1140 by Alena Collado RN  Outcome: Progressing     Pit has been at 10 for a while, contractions are every 2-2.5 minutes. Blanca got incrementally more uncomfortable.       Problem: Labor  Goal: Acceptable Pain Control  2024 by Alena Collado RN  Outcome: Progressing  2024 114 by Alena Collado RN  Outcome: Progressing     Blanca requested nitrous first and then shortly requested to start the epidural process. Dr. Rangel was at the bedside.       Problem: Labor  Goal: Effective Progression to Delivery  2024 by Alena Collado RN  Outcome: Progressing  2024 1140 by Alena Collado RN  Outcome: Progressing     Plan is to check Blanca's cervix soon and update Dr. Corrales.           Plan of Care Reviewed With: patient, spouse    Overall Patient Progress: improvingOverall Patient Progress: improving    Outcome Evaluation: Now more comfortable after an epidural      Plan for

## 2024-09-14 LAB — HGB BLD-MCNC: 9.2 G/DL (ref 11.7–15.7)

## 2024-09-14 PROCEDURE — 36415 COLL VENOUS BLD VENIPUNCTURE: CPT | Performed by: OBSTETRICS & GYNECOLOGY

## 2024-09-14 PROCEDURE — 85018 HEMOGLOBIN: CPT | Performed by: OBSTETRICS & GYNECOLOGY

## 2024-09-14 PROCEDURE — 120N000001 HC R&B MED SURG/OB

## 2024-09-14 PROCEDURE — 250N000013 HC RX MED GY IP 250 OP 250 PS 637: Performed by: OBSTETRICS & GYNECOLOGY

## 2024-09-14 PROCEDURE — 250N000011 HC RX IP 250 OP 636: Performed by: OBSTETRICS & GYNECOLOGY

## 2024-09-14 RX ORDER — HYDROCORTISONE 25 MG/G
CREAM TOPICAL 3 TIMES DAILY PRN
Status: DISCONTINUED | OUTPATIENT
Start: 2024-09-14 | End: 2024-09-16 | Stop reason: HOSPADM

## 2024-09-14 RX ORDER — CARBOPROST TROMETHAMINE 250 UG/ML
250 INJECTION, SOLUTION INTRAMUSCULAR
Status: DISCONTINUED | OUTPATIENT
Start: 2024-09-14 | End: 2024-09-16 | Stop reason: HOSPADM

## 2024-09-14 RX ORDER — ONDANSETRON 2 MG/ML
4 INJECTION INTRAMUSCULAR; INTRAVENOUS EVERY 30 MIN PRN
Status: DISCONTINUED | OUTPATIENT
Start: 2024-09-14 | End: 2024-09-14 | Stop reason: HOSPADM

## 2024-09-14 RX ORDER — SODIUM CHLORIDE, SODIUM LACTATE, POTASSIUM CHLORIDE, CALCIUM CHLORIDE 600; 310; 30; 20 MG/100ML; MG/100ML; MG/100ML; MG/100ML
INJECTION, SOLUTION INTRAVENOUS CONTINUOUS
Status: DISCONTINUED | OUTPATIENT
Start: 2024-09-14 | End: 2024-09-14 | Stop reason: HOSPADM

## 2024-09-14 RX ORDER — ONDANSETRON 2 MG/ML
4 INJECTION INTRAMUSCULAR; INTRAVENOUS EVERY 6 HOURS PRN
Status: DISCONTINUED | OUTPATIENT
Start: 2024-09-14 | End: 2024-09-16 | Stop reason: HOSPADM

## 2024-09-14 RX ORDER — MISOPROSTOL 200 UG/1
800 TABLET ORAL
Status: DISCONTINUED | OUTPATIENT
Start: 2024-09-14 | End: 2024-09-16 | Stop reason: HOSPADM

## 2024-09-14 RX ORDER — METOCLOPRAMIDE 10 MG/1
10 TABLET ORAL EVERY 6 HOURS PRN
Status: DISCONTINUED | OUTPATIENT
Start: 2024-09-14 | End: 2024-09-16 | Stop reason: HOSPADM

## 2024-09-14 RX ORDER — MODIFIED LANOLIN
OINTMENT (GRAM) TOPICAL
Status: DISCONTINUED | OUTPATIENT
Start: 2024-09-14 | End: 2024-09-16 | Stop reason: HOSPADM

## 2024-09-14 RX ORDER — NALOXONE HYDROCHLORIDE 0.4 MG/ML
0.2 INJECTION, SOLUTION INTRAMUSCULAR; INTRAVENOUS; SUBCUTANEOUS
Status: DISCONTINUED | OUTPATIENT
Start: 2024-09-14 | End: 2024-09-16 | Stop reason: HOSPADM

## 2024-09-14 RX ORDER — METHYLERGONOVINE MALEATE 0.2 MG/ML
200 INJECTION INTRAVENOUS
Status: DISCONTINUED | OUTPATIENT
Start: 2024-09-14 | End: 2024-09-16 | Stop reason: HOSPADM

## 2024-09-14 RX ORDER — OXYTOCIN/0.9 % SODIUM CHLORIDE 30/500 ML
340 PLASTIC BAG, INJECTION (ML) INTRAVENOUS CONTINUOUS PRN
Status: DISCONTINUED | OUTPATIENT
Start: 2024-09-14 | End: 2024-09-16 | Stop reason: HOSPADM

## 2024-09-14 RX ORDER — METOCLOPRAMIDE HYDROCHLORIDE 5 MG/ML
10 INJECTION INTRAMUSCULAR; INTRAVENOUS EVERY 6 HOURS PRN
Status: DISCONTINUED | OUTPATIENT
Start: 2024-09-14 | End: 2024-09-16 | Stop reason: HOSPADM

## 2024-09-14 RX ORDER — SIMETHICONE 80 MG
80 TABLET,CHEWABLE ORAL 4 TIMES DAILY PRN
Status: DISCONTINUED | OUTPATIENT
Start: 2024-09-14 | End: 2024-09-16 | Stop reason: HOSPADM

## 2024-09-14 RX ORDER — PROCHLORPERAZINE 25 MG
25 SUPPOSITORY, RECTAL RECTAL EVERY 12 HOURS PRN
Status: DISCONTINUED | OUTPATIENT
Start: 2024-09-14 | End: 2024-09-16 | Stop reason: HOSPADM

## 2024-09-14 RX ORDER — IBUPROFEN 800 MG/1
800 TABLET, FILM COATED ORAL EVERY 6 HOURS
Status: DISCONTINUED | OUTPATIENT
Start: 2024-09-15 | End: 2024-09-16 | Stop reason: HOSPADM

## 2024-09-14 RX ORDER — MISOPROSTOL 200 UG/1
400 TABLET ORAL
Status: DISCONTINUED | OUTPATIENT
Start: 2024-09-14 | End: 2024-09-16 | Stop reason: HOSPADM

## 2024-09-14 RX ORDER — NALOXONE HYDROCHLORIDE 0.4 MG/ML
0.4 INJECTION, SOLUTION INTRAMUSCULAR; INTRAVENOUS; SUBCUTANEOUS
Status: DISCONTINUED | OUTPATIENT
Start: 2024-09-14 | End: 2024-09-16 | Stop reason: HOSPADM

## 2024-09-14 RX ORDER — NALOXONE HYDROCHLORIDE 0.4 MG/ML
0.1 INJECTION, SOLUTION INTRAMUSCULAR; INTRAVENOUS; SUBCUTANEOUS
Status: DISCONTINUED | OUTPATIENT
Start: 2024-09-14 | End: 2024-09-14 | Stop reason: HOSPADM

## 2024-09-14 RX ORDER — OXYCODONE HYDROCHLORIDE 5 MG/1
5 TABLET ORAL EVERY 4 HOURS PRN
Status: DISCONTINUED | OUTPATIENT
Start: 2024-09-14 | End: 2024-09-16 | Stop reason: HOSPADM

## 2024-09-14 RX ORDER — DEXTROSE, SODIUM CHLORIDE, SODIUM LACTATE, POTASSIUM CHLORIDE, AND CALCIUM CHLORIDE 5; .6; .31; .03; .02 G/100ML; G/100ML; G/100ML; G/100ML; G/100ML
INJECTION, SOLUTION INTRAVENOUS CONTINUOUS
Status: DISCONTINUED | OUTPATIENT
Start: 2024-09-14 | End: 2024-09-15

## 2024-09-14 RX ORDER — DEXAMETHASONE SODIUM PHOSPHATE 4 MG/ML
4 INJECTION, SOLUTION INTRA-ARTICULAR; INTRALESIONAL; INTRAMUSCULAR; INTRAVENOUS; SOFT TISSUE
Status: DISCONTINUED | OUTPATIENT
Start: 2024-09-14 | End: 2024-09-14 | Stop reason: HOSPADM

## 2024-09-14 RX ORDER — PROCHLORPERAZINE MALEATE 10 MG
10 TABLET ORAL EVERY 6 HOURS PRN
Status: DISCONTINUED | OUTPATIENT
Start: 2024-09-14 | End: 2024-09-16 | Stop reason: HOSPADM

## 2024-09-14 RX ORDER — LOPERAMIDE HCL 2 MG
2 CAPSULE ORAL
Status: DISCONTINUED | OUTPATIENT
Start: 2024-09-14 | End: 2024-09-16 | Stop reason: HOSPADM

## 2024-09-14 RX ORDER — BISACODYL 10 MG
10 SUPPOSITORY, RECTAL RECTAL DAILY PRN
Status: DISCONTINUED | OUTPATIENT
Start: 2024-09-16 | End: 2024-09-16 | Stop reason: HOSPADM

## 2024-09-14 RX ORDER — KETOROLAC TROMETHAMINE 30 MG/ML
30 INJECTION, SOLUTION INTRAMUSCULAR; INTRAVENOUS EVERY 6 HOURS
Status: COMPLETED | OUTPATIENT
Start: 2024-09-14 | End: 2024-09-14

## 2024-09-14 RX ORDER — MEPERIDINE HYDROCHLORIDE 25 MG/ML
12.5 INJECTION INTRAMUSCULAR; INTRAVENOUS; SUBCUTANEOUS EVERY 5 MIN PRN
Status: DISCONTINUED | OUTPATIENT
Start: 2024-09-14 | End: 2024-09-14 | Stop reason: HOSPADM

## 2024-09-14 RX ORDER — ONDANSETRON 4 MG/1
4 TABLET, ORALLY DISINTEGRATING ORAL EVERY 30 MIN PRN
Status: DISCONTINUED | OUTPATIENT
Start: 2024-09-14 | End: 2024-09-14 | Stop reason: HOSPADM

## 2024-09-14 RX ORDER — AMOXICILLIN 250 MG
2 CAPSULE ORAL 2 TIMES DAILY
Status: DISCONTINUED | OUTPATIENT
Start: 2024-09-14 | End: 2024-09-16 | Stop reason: HOSPADM

## 2024-09-14 RX ORDER — FENTANYL CITRATE 50 UG/ML
50 INJECTION, SOLUTION INTRAMUSCULAR; INTRAVENOUS EVERY 5 MIN PRN
Status: DISCONTINUED | OUTPATIENT
Start: 2024-09-14 | End: 2024-09-14 | Stop reason: HOSPADM

## 2024-09-14 RX ORDER — ACETAMINOPHEN 325 MG/1
975 TABLET ORAL EVERY 6 HOURS
Status: DISCONTINUED | OUTPATIENT
Start: 2024-09-14 | End: 2024-09-16 | Stop reason: HOSPADM

## 2024-09-14 RX ORDER — HYDROMORPHONE HCL IN WATER/PF 6 MG/30 ML
0.4 PATIENT CONTROLLED ANALGESIA SYRINGE INTRAVENOUS EVERY 5 MIN PRN
Status: DISCONTINUED | OUTPATIENT
Start: 2024-09-14 | End: 2024-09-14 | Stop reason: HOSPADM

## 2024-09-14 RX ORDER — SODIUM PHOSPHATE,MONO-DIBASIC 19G-7G/118
1 ENEMA (ML) RECTAL DAILY PRN
Status: DISCONTINUED | OUTPATIENT
Start: 2024-09-16 | End: 2024-09-16 | Stop reason: HOSPADM

## 2024-09-14 RX ORDER — FENTANYL CITRATE 50 UG/ML
25 INJECTION, SOLUTION INTRAMUSCULAR; INTRAVENOUS EVERY 5 MIN PRN
Status: DISCONTINUED | OUTPATIENT
Start: 2024-09-14 | End: 2024-09-14 | Stop reason: HOSPADM

## 2024-09-14 RX ORDER — AMOXICILLIN 250 MG
1 CAPSULE ORAL 2 TIMES DAILY
Status: DISCONTINUED | OUTPATIENT
Start: 2024-09-14 | End: 2024-09-16 | Stop reason: HOSPADM

## 2024-09-14 RX ORDER — TRANEXAMIC ACID 10 MG/ML
1 INJECTION, SOLUTION INTRAVENOUS EVERY 30 MIN PRN
Status: DISCONTINUED | OUTPATIENT
Start: 2024-09-14 | End: 2024-09-16 | Stop reason: HOSPADM

## 2024-09-14 RX ORDER — HYDROMORPHONE HCL IN WATER/PF 6 MG/30 ML
0.2 PATIENT CONTROLLED ANALGESIA SYRINGE INTRAVENOUS EVERY 5 MIN PRN
Status: DISCONTINUED | OUTPATIENT
Start: 2024-09-14 | End: 2024-09-14 | Stop reason: HOSPADM

## 2024-09-14 RX ORDER — LOPERAMIDE HCL 2 MG
4 CAPSULE ORAL
Status: DISCONTINUED | OUTPATIENT
Start: 2024-09-14 | End: 2024-09-16 | Stop reason: HOSPADM

## 2024-09-14 RX ORDER — LIDOCAINE 40 MG/G
CREAM TOPICAL
Status: DISCONTINUED | OUTPATIENT
Start: 2024-09-14 | End: 2024-09-16 | Stop reason: HOSPADM

## 2024-09-14 RX ORDER — ONDANSETRON 4 MG/1
4 TABLET, ORALLY DISINTEGRATING ORAL EVERY 6 HOURS PRN
Status: DISCONTINUED | OUTPATIENT
Start: 2024-09-14 | End: 2024-09-16 | Stop reason: HOSPADM

## 2024-09-14 RX ORDER — OXYTOCIN 10 [USP'U]/ML
10 INJECTION, SOLUTION INTRAMUSCULAR; INTRAVENOUS
Status: DISCONTINUED | OUTPATIENT
Start: 2024-09-14 | End: 2024-09-16 | Stop reason: HOSPADM

## 2024-09-14 RX ADMIN — SENNOSIDES AND DOCUSATE SODIUM 1 TABLET: 8.6; 5 TABLET ORAL at 09:49

## 2024-09-14 RX ADMIN — KETOROLAC TROMETHAMINE 30 MG: 30 INJECTION, SOLUTION INTRAMUSCULAR at 21:41

## 2024-09-14 RX ADMIN — OXYCODONE HYDROCHLORIDE 5 MG: 5 TABLET ORAL at 13:40

## 2024-09-14 RX ADMIN — ACETAMINOPHEN 975 MG: 325 TABLET ORAL at 21:41

## 2024-09-14 RX ADMIN — SENNOSIDES AND DOCUSATE SODIUM 2 TABLET: 8.6; 5 TABLET ORAL at 21:41

## 2024-09-14 RX ADMIN — KETOROLAC TROMETHAMINE 30 MG: 30 INJECTION, SOLUTION INTRAMUSCULAR at 15:47

## 2024-09-14 RX ADMIN — ACETAMINOPHEN 975 MG: 325 TABLET ORAL at 15:47

## 2024-09-14 RX ADMIN — OXYCODONE HYDROCHLORIDE 5 MG: 5 TABLET ORAL at 23:45

## 2024-09-14 RX ADMIN — OXYCODONE HYDROCHLORIDE 5 MG: 5 TABLET ORAL at 18:11

## 2024-09-14 RX ADMIN — ACETAMINOPHEN 975 MG: 325 TABLET ORAL at 09:49

## 2024-09-14 RX ADMIN — SODIUM CHLORIDE, SODIUM LACTATE, POTASSIUM CHLORIDE, CALCIUM CHLORIDE AND DEXTROSE MONOHYDRATE: 5; 600; 310; 30; 20 INJECTION, SOLUTION INTRAVENOUS at 10:15

## 2024-09-14 RX ADMIN — KETOROLAC TROMETHAMINE 30 MG: 30 INJECTION, SOLUTION INTRAMUSCULAR at 09:49

## 2024-09-14 RX ADMIN — ACETAMINOPHEN 975 MG: 325 TABLET ORAL at 04:36

## 2024-09-14 ASSESSMENT — ACTIVITIES OF DAILY LIVING (ADL)
ADLS_ACUITY_SCORE: 23
ADLS_ACUITY_SCORE: 18
ADLS_ACUITY_SCORE: 23
ADLS_ACUITY_SCORE: 18
ADLS_ACUITY_SCORE: 23
ADLS_ACUITY_SCORE: 18
ADLS_ACUITY_SCORE: 23
ADLS_ACUITY_SCORE: 23
ADLS_ACUITY_SCORE: 18
ADLS_ACUITY_SCORE: 23
ADLS_ACUITY_SCORE: 18
ADLS_ACUITY_SCORE: 23
ADLS_ACUITY_SCORE: 23

## 2024-09-14 NOTE — ANESTHESIA PROCEDURE NOTES
Airway       Patient location during procedure: OR       Procedure Start/Stop Times: 9/13/2024 10:51 PM  Staff -        CRNA: Kole Lopes APRN CRNA       Performed By: CRNAIndications and Patient Condition       Indications for airway management: charles-procedural       Induction type:RSI       Mask difficulty assessment: 0 - not attempted    Final Airway Details       Final airway type: endotracheal airway       Successful airway: ETT - single  Endotracheal Airway Details        ETT size (mm): 6.5       Cuffed: yes       Successful intubation technique: direct laryngoscopy and video laryngoscopy       VL Blade Size: Glidescope 3       Grade View of Cords: 1       Adjucts: stylet       Position: Right       Measured from: gums/teeth       Secured at (cm): 22       Bite block used: Oral Airway    Post intubation assessment        Placement verified by: capnometry and equal breath sounds        Number of attempts at approach: 1       Secured with: tape       Ease of procedure: easy       Dentition: Intact and Unchanged    Medication(s) Administered   Medication Administration Time: 9/13/2024 10:51 PM

## 2024-09-14 NOTE — PLAN OF CARE
Goal Outcome Evaluation:      Plan of Care Reviewed With: patient, spouse    Overall Patient Progress: improvingOverall Patient Progress: improving    Outcome Evaluation: Improving towards discharge      Rating pain 2-5/10 to abdomen/incision. Abdominal binder applied. Scheduled Tylenol and Toradol. PRN Oxycodone given X 1 when pain increased to 5/10. Endorses increased pain with movement in and out of bed; abdominal binder has been helpful to manage.    Up with A-1 in room and to the bathroom. Was able to ambulate to the bathroom at 1340 and void 200 ml without feeling light headed, dizzy or feeling like she was going to faint like the last 2 times out of bed. SCDs on.    Tolerating regular diet. IVF running. Encouraged PO intake.     Has been exclusively breastfeeding baby. Independent with baby cares in bed with help from spouse.    Needs to complete birth certificate, mood assessment and watch the shaken baby video

## 2024-09-14 NOTE — PROGRESS NOTES
Provider notification:   Provider: Antoni BANKS was at bedside at 2209 and reviewed: a persistent Category II fetal heart rate tracing.    Category II Algorithm     Fetal heart rate and uterine activity reviewed with provider.    EFM interpretation suggests: concern for persistent Category II tracing due to: minimal variability.  EFM suggests concern for interruption of the oxygen pathway due to:  variable decelerations and prolonged decelerations.     Interventions to improve fetal oxygenation for a Category II tracing include: maternal repositioning with pushing.    After discussion with provider: Delivery team at bedside, IHOB paged to bedside, 2 RNs at bedside. Emergency C-section called.

## 2024-09-14 NOTE — PROVIDER NOTIFICATION
MD Antoni notified at 2033 with SVE, status update, uterine activity, labor status and small bag of water in front of baby's head.     POC per MD Antoni: continue with IV Pitocin titration, may go up to 8 jared-units and if contraction pattern goes into tachysystole RN to go back to 6 jared-units of Pitocin.

## 2024-09-14 NOTE — ANESTHESIA POSTPROCEDURE EVALUATION
Patient: Blanca Delong    Procedure: Procedure(s):   SECTION       Anesthesia Type:  Epidural    Note:  Disposition: Inpatient   Postop Pain Control: Uneventful            Sign Out: Well controlled pain   PONV: No   Neuro/Psych: Uneventful            Sign Out: Acceptable/Baseline neuro status   Airway/Respiratory: Uneventful            Sign Out: Acceptable/Baseline resp. status   CV/Hemodynamics: Uneventful            Sign Out: Acceptable CV status; No obvious hypovolemia; No obvious fluid overload   Other NRE: NONE   DID A NON-ROUTINE EVENT OCCUR? No           Last vitals:  Vitals Value Taken Time   /72 24 0015   Temp 36.8  C (98.24  F) 24 0028   Pulse 66 24 0028   Resp 16 24 0028   SpO2 98 % 24 0028   Vitals shown include unfiled device data.    Electronically Signed By: Jabari Rangel MD  2024  12:29 AM

## 2024-09-14 NOTE — PLAN OF CARE
Goal Outcome Evaluation:                      STAT c section called and patient delivered a viable infant boy at 2254. Patient arrived to PACU at 2355 on 09/13/2024. Patient arrived back to her room in [facility] at 0050 on 09/14/2024 and report was given to Mayela MCINTOSH RN.     Crystal Morales RN 1:21 AM 09/14/24

## 2024-09-14 NOTE — PLAN OF CARE
Goal Outcome Evaluation:      Plan of Care Reviewed With: patient    Overall Patient Progress: improvingOverall Patient Progress: improving    Outcome Evaluation: Patient's VSS. Reports mild incisional pain, managed with ice packs and scheduled medication. Dressing dry and intact with scant amount of outlined drainage. Rutherford catheter remains in place per order. Fundus is firm and bleeding is appropriate. Patient is breastfeeding and bonding well with . Spouse is present and supportive at bedside.        Problem: Postpartum ( Delivery)  Goal: Successful Parent Role Transition  Outcome: Progressing     Problem: Postpartum ( Delivery)  Goal: Optimal Pain Control and Function  Outcome: Progressing     Problem: Breastfeeding  Goal: Effective Breastfeeding  Outcome: Progressing

## 2024-09-14 NOTE — ANESTHESIA CARE TRANSFER NOTE
Patient: Blanca Delogn    Procedure: Procedure(s):   SECTION       Diagnosis: Fetal intolerance to labor, delivered, current hospitalization [O77.9]  Diagnosis Additional Information: No value filed.    Anesthesia Type:   Epidural     Note:    Oropharynx: oropharynx clear of all foreign objects  Level of Consciousness: awake  Oxygen Supplementation: room air    Independent Airway: airway patency satisfactory and stable  Dentition: dentition unchanged  Vital Signs Stable: post-procedure vital signs reviewed and stable  Report to RN Given: handoff report given  Patient transferred to: PACU    Handoff Report: Identifed the Patient, Identified the Reponsible Provider, Reviewed the pertinent medical history, Discussed the surgical course, Reviewed Intra-OP anesthesia mangement and issues during anesthesia, Set expectations for post-procedure period and Allowed opportunity for questions and acknowledgement of understanding      Vitals:  Vitals Value Taken Time   /71 24 2356   Temp 36.5  C (97.7  F) 24 2359   Pulse 63 24 2359   Resp 12 24 2359   SpO2 99 % 24 2359   Vitals shown include unfiled device data.    Electronically Signed By: Kole Lopes Jr, APRN CRNA  2024  12:00 AM

## 2024-09-14 NOTE — PLAN OF CARE
Patient's vitals and maternal assessments WDL. Pain rated 3/10, declines pain medication at this time. Rutherford is to remain in place overnight per MD Denise (see patient care order). Breastfeeding well. Patient is attentive to infant needs, asking appropriate questions and holds infant frequently.     Problem: Adult Inpatient Plan of Care  Goal: Optimal Comfort and Wellbeing  Outcome: Progressing  Intervention: Provide Person-Centered Care  Recent Flowsheet Documentation  Taken 2024 0200 by Mayela Zaidi RN  Trust Relationship/Rapport:   care explained   choices provided   emotional support provided   empathic listening provided   questions answered   questions encouraged   reassurance provided   thoughts/feelings acknowledged     Problem: Postpartum ( Delivery)  Goal: Successful Parent Role Transition  Outcome: Progressing     Problem: Postpartum ( Delivery)  Goal: Optimal Pain Control and Function  Outcome: Progressing     Problem: Breastfeeding  Goal: Effective Breastfeeding  Outcome: Progressing   Goal Outcome Evaluation:      Plan of Care Reviewed With: patient    Overall Patient Progress: improvingOverall Patient Progress: improving    Outcome Evaluation: uncomplicated postpartum recovery, plan to turn over to PP at 0300.

## 2024-09-14 NOTE — BRIEF OP NOTE
Asked to assist with  C section by Dr Walker. See Surgeons documentation for details of the procedure. I specifically assisted in identifying and managing bleeding, maximizing surgical exposure, facilitation of delivery and closure.      Miley Corrales MD

## 2024-09-14 NOTE — ANESTHESIA PROCEDURE NOTES
"Epidural catheter Procedure Note    Pre-Procedure   Staff -        Anesthesiologist:  Jabari Rangel MD       Performed By: anesthesiologist       Location: OB       Procedure Start/Stop Times: 9/13/2024 5:58 PM and 9/13/2024 6:14 PM       Pre-Anesthestic Checklist: patient identified, IV checked, risks and benefits discussed, informed consent, monitors and equipment checked, pre-op evaluation and at physician/surgeon's request  Timeout:       Correct Patient: Yes        Correct Procedure: Yes        Correct Site: Yes        Correct Position: Yes   Procedure Documentation  Procedure: epidural catheter       Diagnosis: labor pain for anticipated vaginal delivery       Patient Position: sitting       Patient Prep/Sterile Barriers: sterile gloves, mask, patient draped       Skin prep: Chloraprep       Local skin infiltrated with 3 mL of 1% lidocaine.        Insertion Site: L3-4. (midline approach).       Technique: LORT saline        ELHAM at 4 cm.       Needle Type: Trigemina       Needle Gauge: 18.        Needle Length (Inches): 3.5        Catheter: 20 G.          Catheter threaded easily.         6 cm epidural space.         Threaded 10 cm at skin.         # of attempts: 1 and  # of redirects:  0    Assessment/Narrative         Paresthesias: No.       Test dose of 3 mL lidocaine 1.5% w/ 1:200,000 epinephrine at 18:07 CDT.         Test dose negative, 3 minutes after injection, for signs of intravascular, subdural, or intrathecal injection.       Insertion/Infusion Method: LORT saline       Aspiration negative for Heme or CSF via Epidural Catheter.    Medication(s) Administered   0.25% Bupivacaine PF (Epidural) - EPIDURAL   5 mL - 9/13/2024 6:12:00 PM  Medication Administration Time: 9/13/2024 5:58 PM      FOR [facility] ([facility]) ONLY:   Pain Team Contact information: please page the Pain Team Via [employer]. Search \"Pain\". During daytime hours, please page the attending first. At night please page the resident first.      "

## 2024-09-14 NOTE — PROGRESS NOTES
*Late entry due to patient care*     Writer of this note was present at the bedside for the entirety of the pushing event as well as when the Emergency  was called. See Miley Corrales MD and Navid Walker MD notes for details.

## 2024-09-14 NOTE — PROVIDER NOTIFICATION
09/13/24 1900   Provider Notification   Provider Name/Title dr. abebe   Method of Notification Phone   Request Evaluate - Remote   Notification Reason SVE;Status Update;Pain;Uterine Activity;Labor Status;Membrane Status       RN notified Dr. Abebe that Blanca received an epidural at 6pm and efm moderate variability with one variable. RN discussed contraction pattern and halving the pit. Rn reported SVE    Dr. Abebe would like an update from the next nurse at 8:30pm

## 2024-09-14 NOTE — PROGRESS NOTES
Data: Blanca Delong transferred to [id number] via bed. Patient transferred to Postpartum with .    Action: Receiving unit notified of transfer. Patient and support person notified of room change. Patient and belongings accompanied by Registered Nurse. Bedside report given to Noman Quezada RN. Fundal check performed with receiving RN. Oriented patient to surroundings. Call light within reach.    Response: Patient tolerated transfer.    Mayela Zaidi RN  2024 3:28 AM

## 2024-09-14 NOTE — PROGRESS NOTES
POD 1  general for bradycardia  Called for an episode of passing out.  Pt was sitting on the toilet trying to void, got up, nurse was with her and she was not responsive.  Pt had already eaten a full meal today.  Does not have a history of diabetes.  Hemoglobin this morning stable (9.5 to 9.2).  She does not have any pain.  Pt states she knows when this is going to happen before it happens.  She begins to feel a pressure sensation in her head.  States this has been happening since she was 10 years old.  Happens 1-3 times a year.  She has not gotten worked up for it.  Denies ever being told she has POTS. States she has  a friend with POTS.  She has another child at home.  We discussed if she is at home, holding the baby and she feels it is coming on, to go to the floor immediately.  Also discussed seeing her primary for a POTS workup.    Vitals are stable, urine output is adequate.  Scanned for 420cc, unable to void, will straight cath.  Will check a hemoglobin again in the am.   ALMA Phillips MD

## 2024-09-14 NOTE — OP NOTE
Section Operative Note    NAME:  Blanca Delong     RECORD # [id number]     2024    DATE OF SERVICE: 2024   PREOPERATIVE DIAGNOSIS:   1) IUP at 41 weeks  2) Postdates induction  3) Bradycardia during second stage, too high for vacuum delivery    POSTOPERATIVE DIAGNOSIS:   1) IUP at 41 weeks  2) Postdates induction  3) Bradycardia during second stage, too high for vacuum delivery    PROCEDURE: Low transverse  section    SURGEON:  Navid Walker M.D.    ASSISTANT: Dr. Corrales was needed for the entire surgery given the STAT nature of the situation and no other qualified surgical assistant or resident was available. She assisted in retraction, delivering the infant with fundal pressure, and help with closure of all layers.     ANESTHESIA: Spinal    ESTIMATED BLOOD LOSS: 900 cc    DRAINS: Rutherford catheter.    COMPLICATIONS: None    INDICATIONS:  Ms. Blanca Delong is a 34 year old year old who presented for labor induction for postdates. She received Pitocin with SROM. She progressed through labor with no issues. When she dilated to complete, variable decelerations were noted. She pushed for about 20 minutes when bradycardia was noted. Usual maneuvers were done, but still bradycardia remained with no variability. The infant was too high for a vacuum assisted delivery. I verbally consented her for a  section and she was agreeable.     FINDINGS:  Live male infant born with Apgars of 8 at one minute, 9 at 5 minutes. Cord noted at the infants head with cord delivery first and then infant's head. Normal tubes, ovaries, and pelvic organs.    PROCEDURE:  Patient was met preoperatively where we discussed the procedure and the risks associated with the procedure.  She understood these to include but not limited to injury to adjacent organs including bowel, bladder, ureter, infection and bleeding. Understanding these risks her consents were signed.      After informed consent was  obtained, the patient was taken to the operating room where she was given GETA anesthesia.  She was placed in the left lateral tilt position and prepped and draped in usual sterile fashion.  A timeout was undertaken. This was a STAT situations and all protocols were followed.      A Pfannenstiel skin incision was made with the scalpel and taken down through the subcutaneous tissue.  The rectus fascia was identified and scored in the midline.  The rectus fascia was then opened bilaterally.  The rectus fascia was taken down from the underlying muscle superiorly and inferiorly.  The peritoneum was identified and entered bluntly.  The bladder flap was created with the Metzenbaum scissors.  The bladder blade was inserted.    A low segment transverse uterine incision was made with a scalpel.  The uterine incision was opened bluntly with my fingers.  The infant was delivered from a vertex position.  The head was delivered atraumatically.  The remainder of the body was delivered without issues.  The mouth and nares were suctioned.  The cord was doubly clamped and cut and the infant was handed to the pediatric team with the findings as noted above.  The placenta was then removed with gentle traction.  Uterine cavity was wiped free of clot and debris. The uterine incision was closed using 0 Vicryl in a running locked fashion.  There was an extension on the left side. This was repaired with great care. A second imbricating layer was placed using 0 Monocryl.  The gutters were irrigated. Hemostasis was achieved. Surgicel was placed per my routine.     The peritoneum was closed using 3-0 Vicryl. The rectus muscles were hemostatic.  The rectus fascia was closed using 0 vicryl, starting at the lateral edges meeting in the midline.  Skin margins were re-approximated using the Insorb staplers.      Sponge and needle counts were reported to me as correct X 2.  Mother and infant are stable.      Navid Walker M.D.

## 2024-09-14 NOTE — PROVIDER NOTIFICATION
09/14/24 1030   Provider Notification   Provider Name/Title Alan   Method of Notification In Department   Request Evaluate in Person   Notification Reason Status Update  (Update on significant event/staff assist code called)       Provider paged. Dr. Phillips at the bedside to assess patient d/t unresponsive event/near fainting episode in the bathroom.

## 2024-09-14 NOTE — LACTATION NOTE
This note was copied from a baby's chart.  Initial Lactation Visit    Hours since delivery: 19hours old    Gestational age at delivery: 41w2d     Diaper count: 2 wet 5 soiled      Feedings: 5 breast  0 supplement          Breastfeeding goals:6-12 months    Past breastfeeding experience:  breastfeed first baby did use formula by choice occasionally     Maternal health risk that may affect breastfeeding:AMA- per AVS     Home Pump: does have 2 pumps at home- mom cozy and one she can not remember the name, she is interested in a better wearable pump.      Breast assessment:Round and Symmetrical, Everted and Intact    Infant oral assessment: Midline,     Feeding assessment: mom easily latched infant onto left breast, did not adjust positioning, sort of using a laid back craddle hold.    Infant had a rhythmic sucking pattern with swallows noted about every 8 sucks.        Feeding plan: feed on demand about every 2-3 hours 8 times a day.           Follow up: Will follow up Sunday

## 2024-09-14 NOTE — PROGRESS NOTES
"Postpartum Day 1:       Subjective:  The patient feels well. The patient has no emotional concerns. Pain is well controlled with current medications. The patient is ambulating well. Catheter just removed. The amount and color of the lochia is appropriate for the duration of recovery, patient denies clots. The baby is well.    Objective   The patient has a blood pressure which is within the normal range. Urinary output is adequate.     Exam:   /51 (BP Location: Left arm, Patient Position: Semi-Sosa's, Cuff Size: Adult Regular)   Pulse 75   Temp 99.1  F (37.3  C) (Oral)   Resp 16   Ht 1.6 m (5' 3\")   Wt 76.7 kg (169 lb)   SpO2 99%   Breastfeeding Unknown   BMI 29.94 kg/m    General: NAD  Abdomen: soft, NT  Fundus: firm, nontender  Incision: Bandage on  Ext: no pain       Impression:   Normal postpartum course. POD#1, LTCS secondary to STAT C/S under GETA for fetal bradycadia    Plan:   - Continue current care.  - Doing well    Navid Walker M.D.  "

## 2024-09-14 NOTE — PROVIDER NOTIFICATION
MD Antoni notified of decelerations, SVE and SROM clear/non odorous fluid. RN requesting to place FSE and IUPC. FSE placed at 2148.      Provider notification:   Provider: Antoni BANKS was notified at 2140 regarding: a persistent Category II fetal heart rate tracing for 20 minutes.    Category II Algorithm     Fetal heart rate and uterine activity reviewed with provider.    EFM interpretation suggests: absence of concern for metabolic acidemia due to: presence of accelerations and moderate variability. EFM suggests concern for interruption of the oxygen pathway due to:  variable decelerations.    Interventions to improve fetal oxygenation for a Category II tracing include: Category II algorithm reviewed, continue monitoring, emotional support, evaluate labor progress, IV fluid bolus , maternal positioning, oxytocin discontinued, and sterile vaginal exam. A 2nd RN was present for assistance with maternal repositioning and FSE placement.    After discussion with provider:Provider coming to bedside

## 2024-09-14 NOTE — PROGRESS NOTES
Called by nursing at 2140 for down FHT. Pitocin off, moved to hands and knees and on my arrival she had been noted to be complete so had moved from 6 to complete very quickly. Intermittent variables to 70's with pushing initially on hands and knees. Fair variability and return to baseline in between. Moved to semifowlers pushed x 3 with FHT's then to 60's with minimal return. Pt moved to right side lying. NICU and Dr Walker In house OB called for. With continued down towns after 2 pushes pt moved to left side lying. Fetal head determined to be too high for vacuum and decision was made for Emergency Csection with minimal variability and no return to baseline with FHT's 60-90.     Miley Corrales MD

## 2024-09-14 NOTE — PROGRESS NOTES
Provider notification:   Provider: Antoni BANKS was at the bedside at 2210 for initiation of second stage of labor.     Second Stage of Labor Algorithm    Passenger:  See flowsheets for fetal heart rate tracing data.   EFM interpretation suggests: concern for persistent Category II tracing due to: minimal variability.  EFM suggests concern for interruption of the oxygen pathway due to:  variable decelerations and prolonged decelerations    Power  See flowsheets for uterine activity data.  Contraction frequency: every 1-3 minutes.   Contraction strength: strong by palpation.    Maternal current position change frequency: approximately Q5 minutes due to FHR decelerations.     Psyche  Epidural / ITN: Yes  Maternal pain management: comfortable and coping  Urge to push: present    Plan  After discussion with provider:  Interventions to optimize second stage:active pushing and provider at bedside

## 2024-09-14 NOTE — SIGNIFICANT EVENT
"After standing at bedside for the first time since c/s, patient sat down on edge of bed with RN at bedside. Once patient sat down, she became very pale, saying \"I feel dizzy and off.\" RN was asking patient to lie down in bed, patient was not responding. Staff assist called. Patient was able to then help get herself back into lying position. VS were 105/57, HR 65, O2 100%. While laying, patient stated she felt much better. RN encouraged patient to increase water intake and order breakfast. Pt declined juice or snack while she waits for breakfast.   "

## 2024-09-14 NOTE — ANESTHESIA PREPROCEDURE EVALUATION
"Anesthesia Pre-Procedure Evaluation    Patient: Blanca Delong   MRN: [id number] : 1990        Procedure :           Past Medical History:   Diagnosis Date    Ulcerative colitis (H) 2004    Uncomplicated asthma       History reviewed. No pertinent surgical history.   No Known Allergies   Social History     Tobacco Use    Smoking status: Never     Passive exposure: Never    Smokeless tobacco: Never   Substance Use Topics    Alcohol use: Not Currently      Wt Readings from Last 1 Encounters:   24 76.7 kg (169 lb)        Anesthesia Evaluation   Pt has had prior anesthetic. Type: OB Labor Epidural.    No history of anesthetic complications       ROS/MED HX  ENT/Pulmonary:  - neg pulmonary ROS   (+)                      asthma                  Neurologic:  - neg neurologic ROS     Cardiovascular: Comment: Physiologic changes of pregnancy - neg cardiovascular ROS     METS/Exercise Tolerance:     Hematologic:  - neg hematologic  ROS     Musculoskeletal:       GI/Hepatic: Comment: UC - neg GI/hepatic ROS     Renal/Genitourinary:       Endo:  - neg endo ROS     Psychiatric/Substance Use:  - neg psychiatric ROS     Infectious Disease:       Malignancy:       Other:            Physical Exam    Airway        Mallampati: II   TM distance: > 3 FB   Neck ROM: full   Mouth opening: > 3 cm    Respiratory Devices and Support         Dental  no notable dental history         Cardiovascular   cardiovascular exam normal          Pulmonary   pulmonary exam normal            Other findings: Physiologic changes of pregnancy    OUTSIDE LABS:  CBC:   Lab Results   Component Value Date    WBC 5.6 2024    WBC 6.5 2022    HGB 9.6 (L) 2024    HGB 12.4 2024    HCT 36.8 2024    HCT 36.3 2022     2024     2022     BMP: No results found for: \"NA\", \"POTASSIUM\", \"CHLORIDE\", \"CO2\", \"BUN\", \"CR\", \"GLC\"  COAGS: No results found for: \"PTT\", \"INR\", \"FIBR\"  POC: No results " "found for: \"BGM\", \"HCG\", \"HCGS\"  HEPATIC: No results found for: \"ALBUMIN\", \"PROTTOTAL\", \"ALT\", \"AST\", \"GGT\", \"ALKPHOS\", \"BILITOTAL\", \"BILIDIRECT\", \"JOSE\"  OTHER: No results found for: \"PH\", \"LACT\", \"A1C\", \"TAWANDA\", \"PHOS\", \"MAG\", \"LIPASE\", \"AMYLASE\", \"TSH\", \"T4\", \"T3\", \"CRP\", \"SED\"    Anesthesia Plan    ASA Status:  2, emergent       Anesthesia Type: Epidural.              Consents    Anesthesia Plan(s) and associated risks, benefits, and realistic alternatives discussed. Questions answered and patient/representative(s) expressed understanding.     - Discussed:     - Discussed with:  Patient            Postoperative Care            Comments:    Other Comments: Patient requests labor epidural. Chart reviewed, including labs. Reviewed options and risks with the patient, including but not limited to: bleeding, infection, damage to tissues under the skin (nerves, muscles, blood vessels), hypotension, headache, and epidural failure. Questions answered, consent signed. Patient agrees to elective labor epidural.     24 2243  now indicated for fetal distress. OB surgeon requests GA. Immediately available. ASA physical status 2E.        neg OB ROS.      Jabari Rangel MD    I have reviewed the pertinent notes and labs in the chart from the past 30 days and (re)examined the patient.  Any updates or changes from those notes are reflected in this note.                  "

## 2024-09-14 NOTE — SIGNIFICANT EVENT
"1000 patient stood at the bedside, marched in place and then ambulated to the bathroom. While sitting on the toilet attempting to void for the first time since tavarez removal, she became pale, less unresponsive and stated \"I am feeling hot. I can feel the pressure in my head.\" Staff assist code called. Assisted patient to sit to stand back to bed. Immediatly after getting back to bed patient stated, \"I am already feeling better.\"    VS 1013 after getting back to bed: BP 94/55. HR 59. O2 98%. RR 20. IVF initiated. MD paged to assess patient.    Reviewed with patient to not get OOB without assist from staff. Call light within reach. Bladder scanned for 422. Straight cathed for 400 of clear yellow urine; tolerated procedure.  "

## 2024-09-14 NOTE — PROGRESS NOTES
D:  Patient admitted to [facility]/PRQE96-7 via bed with  and support person Vel.  A:  Bedside report received from MARIZA Pedro. Oriented patient and family to surroundings; call light within reach. 4 Part ID bands double checked with reporting RN.  R:  Patient and  tolerated transfer. Care assumed.

## 2024-09-15 ENCOUNTER — MEDICAL CORRESPONDENCE (OUTPATIENT)
Dept: HEALTH INFORMATION MANAGEMENT | Facility: CLINIC | Age: 34
End: 2024-09-15
Payer: COMMERCIAL

## 2024-09-15 LAB
ALBUMIN SERPL BCG-MCNC: 2.7 G/DL (ref 3.5–5.2)
ALP SERPL-CCNC: 135 U/L (ref 40–150)
ALT SERPL W P-5'-P-CCNC: 21 U/L (ref 0–50)
ANION GAP SERPL CALCULATED.3IONS-SCNC: 9 MMOL/L (ref 7–15)
AST SERPL W P-5'-P-CCNC: 55 U/L (ref 0–45)
ATRIAL RATE - MUSE: 80 BPM
BILIRUB SERPL-MCNC: 0.7 MG/DL
BUN SERPL-MCNC: 8.9 MG/DL (ref 6–20)
CALCIUM SERPL-MCNC: 8.2 MG/DL (ref 8.8–10.4)
CHLORIDE SERPL-SCNC: 105 MMOL/L (ref 98–107)
CREAT SERPL-MCNC: 0.86 MG/DL (ref 0.51–0.95)
DIASTOLIC BLOOD PRESSURE - MUSE: NORMAL MMHG
EGFRCR SERPLBLD CKD-EPI 2021: 89 ML/MIN/1.73M2
ERYTHROCYTE [DISTWIDTH] IN BLOOD BY AUTOMATED COUNT: 14.5 % (ref 10–15)
GLUCOSE SERPL-MCNC: 93 MG/DL (ref 70–99)
HCO3 SERPL-SCNC: 23 MMOL/L (ref 22–29)
HCT VFR BLD AUTO: 24.8 % (ref 35–47)
HGB BLD-MCNC: 7.7 G/DL (ref 11.7–15.7)
INTERPRETATION ECG - MUSE: NORMAL
MCH RBC QN AUTO: 25.7 PG (ref 26.5–33)
MCHC RBC AUTO-ENTMCNC: 31 G/DL (ref 31.5–36.5)
MCV RBC AUTO: 83 FL (ref 78–100)
NT-PROBNP SERPL-MCNC: 98 PG/ML (ref 0–450)
P AXIS - MUSE: 58 DEGREES
PLATELET # BLD AUTO: 142 10E3/UL (ref 150–450)
POTASSIUM SERPL-SCNC: 4 MMOL/L (ref 3.4–5.3)
PR INTERVAL - MUSE: 134 MS
PROT SERPL-MCNC: 5 G/DL (ref 6.4–8.3)
QRS DURATION - MUSE: 82 MS
QT - MUSE: 368 MS
QTC - MUSE: 424 MS
R AXIS - MUSE: 57 DEGREES
RBC # BLD AUTO: 3 10E6/UL (ref 3.8–5.2)
SODIUM SERPL-SCNC: 137 MMOL/L (ref 135–145)
SYSTOLIC BLOOD PRESSURE - MUSE: NORMAL MMHG
T AXIS - MUSE: 43 DEGREES
VENTRICULAR RATE- MUSE: 80 BPM
WBC # BLD AUTO: 11.1 10E3/UL (ref 4–11)

## 2024-09-15 PROCEDURE — 93005 ELECTROCARDIOGRAM TRACING: CPT | Performed by: FAMILY MEDICINE

## 2024-09-15 PROCEDURE — 258N000003 HC RX IP 258 OP 636: Performed by: FAMILY MEDICINE

## 2024-09-15 PROCEDURE — 83880 ASSAY OF NATRIURETIC PEPTIDE: CPT | Performed by: OBSTETRICS & GYNECOLOGY

## 2024-09-15 PROCEDURE — 99254 IP/OBS CNSLTJ NEW/EST MOD 60: CPT | Performed by: FAMILY MEDICINE

## 2024-09-15 PROCEDURE — 80053 COMPREHEN METABOLIC PANEL: CPT | Performed by: OBSTETRICS & GYNECOLOGY

## 2024-09-15 PROCEDURE — 250N000013 HC RX MED GY IP 250 OP 250 PS 637: Performed by: OBSTETRICS & GYNECOLOGY

## 2024-09-15 PROCEDURE — 258N000003 HC RX IP 258 OP 636: Performed by: OBSTETRICS & GYNECOLOGY

## 2024-09-15 PROCEDURE — 250N000013 HC RX MED GY IP 250 OP 250 PS 637: Performed by: FAMILY MEDICINE

## 2024-09-15 PROCEDURE — 36415 COLL VENOUS BLD VENIPUNCTURE: CPT | Performed by: OBSTETRICS & GYNECOLOGY

## 2024-09-15 PROCEDURE — 85027 COMPLETE CBC AUTOMATED: CPT | Performed by: OBSTETRICS & GYNECOLOGY

## 2024-09-15 PROCEDURE — 120N000001 HC R&B MED SURG/OB

## 2024-09-15 RX ORDER — SODIUM CHLORIDE, SODIUM LACTATE, POTASSIUM CHLORIDE, CALCIUM CHLORIDE 600; 310; 30; 20 MG/100ML; MG/100ML; MG/100ML; MG/100ML
INJECTION, SOLUTION INTRAVENOUS CONTINUOUS
Status: DISCONTINUED | OUTPATIENT
Start: 2024-09-15 | End: 2024-09-16 | Stop reason: HOSPADM

## 2024-09-15 RX ADMIN — OXYCODONE HYDROCHLORIDE 5 MG: 5 TABLET ORAL at 03:34

## 2024-09-15 RX ADMIN — Medication 1 TABLET: at 17:05

## 2024-09-15 RX ADMIN — SODIUM CHLORIDE, POTASSIUM CHLORIDE, SODIUM LACTATE AND CALCIUM CHLORIDE: 600; 310; 30; 20 INJECTION, SOLUTION INTRAVENOUS at 04:42

## 2024-09-15 RX ADMIN — ACETAMINOPHEN 975 MG: 325 TABLET ORAL at 09:40

## 2024-09-15 RX ADMIN — IBUPROFEN 800 MG: 800 TABLET ORAL at 15:18

## 2024-09-15 RX ADMIN — ACETAMINOPHEN 975 MG: 325 TABLET ORAL at 20:50

## 2024-09-15 RX ADMIN — IBUPROFEN 800 MG: 800 TABLET ORAL at 09:40

## 2024-09-15 RX ADMIN — IBUPROFEN 800 MG: 800 TABLET ORAL at 03:34

## 2024-09-15 RX ADMIN — SODIUM CHLORIDE 500 ML: 9 INJECTION, SOLUTION INTRAVENOUS at 15:00

## 2024-09-15 RX ADMIN — ACETAMINOPHEN 975 MG: 325 TABLET ORAL at 03:34

## 2024-09-15 RX ADMIN — IBUPROFEN 800 MG: 800 TABLET ORAL at 20:50

## 2024-09-15 RX ADMIN — ACETAMINOPHEN 975 MG: 325 TABLET ORAL at 15:17

## 2024-09-15 RX ADMIN — SODIUM CHLORIDE, POTASSIUM CHLORIDE, SODIUM LACTATE AND CALCIUM CHLORIDE: 600; 310; 30; 20 INJECTION, SOLUTION INTRAVENOUS at 12:43

## 2024-09-15 RX ADMIN — SIMETHICONE 80 MG: 80 TABLET, CHEWABLE ORAL at 03:33

## 2024-09-15 ASSESSMENT — ACTIVITIES OF DAILY LIVING (ADL)
ADLS_ACUITY_SCORE: 19
ADLS_ACUITY_SCORE: 20
ADLS_ACUITY_SCORE: 19
ADLS_ACUITY_SCORE: 20
ADLS_ACUITY_SCORE: 19
ADLS_ACUITY_SCORE: 19
ADLS_ACUITY_SCORE: 20
ADLS_ACUITY_SCORE: 19
ADLS_ACUITY_SCORE: 19
ADLS_ACUITY_SCORE: 20
ADLS_ACUITY_SCORE: 19
ADLS_ACUITY_SCORE: 20
ADLS_ACUITY_SCORE: 20
ADLS_ACUITY_SCORE: 19

## 2024-09-15 NOTE — PROVIDER NOTIFICATION
Provider paged regarding near-syncopal episode while up to bathroom with nurse assist x2. Patient never lost consciousness but felt lightheaded, nauseous, hot, and vision changes. When patient was able, Rns assisted patient into wheelchair and back into bed. BP 86/46 while laying down. Per provider, recheck blood pressure.

## 2024-09-15 NOTE — PROVIDER NOTIFICATION
"   09/15/24 1353 09/15/24 1354 09/15/24 1355   Vital Signs   Oximeter Heart Rate 78 bpm 90 bpm 99 bpm   /46 (!) 84/50 (!) 86/47   BP Location Left arm Left arm Left arm   Patient Position Semi-Sosa's Sitting Standing   Cuff Size Adult Regular Adult Regular Adult Regular     Orthostatic blood pressures completed per MD order. C/O \"slight pressure in head\" once standing up. Resolved with laying back in bed. Dr. Diego paged to update on results.   "

## 2024-09-15 NOTE — PLAN OF CARE
Goal Outcome Evaluation:      Plan of Care Reviewed With: spouse, patient    Overall Patient Progress: improvingOverall Patient Progress: improving    Outcome Evaluation: Improving towards discharge    Scheduled Tylenol, Ibuprofen and PRN Oxycodone managing pain.     Up with A-2 (for safety d/t history of near fainting episodes) in room and to the bathroom.     Hgb 7.7 this morning. Updated Dr. Fiore. Awaiting hospitalist MD consult.     Tolerating regular diet. IVF running.      Has been exclusively breastfeeding baby. Independent with baby cares in bed with help from spouse.     Needs to complete birth certificate, mood assessment and watch the shaken baby video; reviewed these again with her.

## 2024-09-15 NOTE — CONSULTS
"[facility]  Consult Note - Hospitalist Service  Date of Admission:  2024  Consult Requested by: Dr. Phillips  Reason for Consult: near syncope    Assessment & Plan   Blanca Delong is a 34 year old previous history of vasovagal near syncopal episodes admitted to the hospital for labor and delivery, postop day 2 status post LTCS.  Postpartum consult for near syncope    Near syncope  --- Suspect a mixed picture with underlying propensity for vasovagal syncope in the past now accentuated by volume loss and orthostasis.  --- Orthostatics checked and although numbers borderline ymptomatic and therefore positive  --- Check EKG  --- Give IV fluid bolus  --- Hemoglobin 7.7.  Would recommend primary team consider 1 unit PRBCs to bolster volume to mitigate orthostatic symptoms  --- Iron at discharge  --- Follow-up with primary  --- Do not suspect any further inpatient workup indicated.    Postpartum  --- Postop day #2 status post LTCS  --- Further orders per OB    Acute blood loss anemia  --- Hemoglobin 7.7  --- Consider 1 unit PRBC given near syncope/orthostatic symptoms  --- Iron at discharge    Thank you for involving hospitalist medicine.       Clinically Significant Risk Factors              # Hypoalbuminemia: Lowest albumin = 2.7 g/dL at 9/15/2024  6:45 AM, will monitor as appropriate               # Overweight: Estimated body mass index is 29.94 kg/m  as calculated from the following:    Height as of this encounter: 1.6 m (5' 3\").    Weight as of this encounter: 76.7 kg (169 lb)., PRESENT ON ADMISSION                  Consuelo Diego MD  Hospitalist Service  Securely message with [employer] (more info)  Text page via [facility] Paging/Directory   ______________________________________________________________________    Chief Complaint   Near syncope    History is obtained from the patient    History of Present Illness   Blanca Delong is a 34-year-old  2 para 2 female currently " postop day 2 status post LTCS.  Hospitalist consultation has been requested for near syncope.  Has had 3 episodes here in the hospital of near syncopal events some of which witnessed.    She is able to clearly tell me that she has rare episodes about 3 a year where she has near syncope.  She describes a sensation of a pressure in her sinuses accompanied by vision changes.  She is never had true syncope or never truly lost consciousness but at times has had to lower herself to the floor in order to avoid that.  Similar episodes have happened during this hospital stay.  She endorses triggers factors for these happening at any time including this hospital stay being position changes as well as being in the bathroom either having a bowel movement or urinating.    No history chest pain shortness of breath.  No history of coronary artery disease.  Does have history of ulcerative colitis.  Current hemoglobin 7.7 this morning.  Electrolytes drawn and unremarkable.  Currently feeling well and breast-feeding.      Past Medical History    Past Medical History:   Diagnosis Date    Ulcerative colitis (H) 2004    Uncomplicated asthma        Past Surgical History   History reviewed. No pertinent surgical history.    Medications   I have reviewed this patient's current medications       Review of Systems    The 5 point Review of Systems is negative other than noted in the HPI or here. Lochia mid per report     Physical Exam   Vital Signs: Temp: 97.6  F (36.4  C) Temp src: Oral BP: 111/56 Pulse: 84   Resp: 16 SpO2: 98 % O2 Device: None (Room air)    Weight: 169 lbs 0 oz    General Appearance: Pleasant female, NAD  Respiratory: CTA-B  Cardiovascular: rrr, no murmers rubs or gallops  GI: fundus firn and below umbilicus, no significant tenderness no other masses palpable  Skin: no significant LE edema  Other: Neuro grossly intact     Medical Decision Making             Data     I have personally reviewed the following data over the  past 24 hrs:    11.1 (H)  \   7.7 (L)   / 142 (L)     137 105 8.9 /  93   4.0 23 0.86 \     ALT: 21 AST: 55 (H) AP: 135 TBILI: 0.7   ALB: 2.7 (L) TOT PROTEIN: 5.0 (L) LIPASE: N/A     Trop: N/A BNP: 98       Imaging results reviewed over the past 24 hrs:   No results found for this or any previous visit (from the past 24 hour(s)).

## 2024-09-15 NOTE — LACTATION NOTE
This note was copied from a baby's chart.  Follow up Lactation Visit    Hours since delivery: 2 days old    Gestational age at delivery: 41w2d     Diaper count: 7 wet 1 soiled      Feedings: 7 breast 1 supplement- due to meternal need  15 ml donor milk     Weight Loss: 6% at 24 hours    Breastfeeding goals:12+months    Past breastfeeding experience: went well, did use formula by choice ossacionally    Maternal health risk that may affect breastfeeding:AMA    Home Pump: does have 2 pumps at home- mom cozy and one she can not remember the name, she is interested in a better wearable pump.       Feeding assessment:  declined support at this time, will request today if support is needed, if not LC will follow up Monday.     Feeding plan: encouraged mom to continue offer breast every 2-3 hour, if infant receives supplement mom was encouraged to start pumping.        Follow up: Will follow up Monday unless family requests sooner.

## 2024-09-15 NOTE — PROVIDER NOTIFICATION
"Provider at bedside. Recheck BP 84/56, HR 95. Fundus firm and bleeding appropriate. Per provider, hospitalist consult in the AM, labs orders, LR infusion at 125 ml/hour.     Patient currently resting in bed and \"feeling better, just tired.\" Will monitor BP closely.  "

## 2024-09-15 NOTE — PLAN OF CARE
Goal Outcome Evaluation:      Plan of Care Reviewed With: patient    Overall Patient Progress: improvingOverall Patient Progress: improving    Outcome Evaluation: Patient's VSS. Reports mild to moderate incisional pain, managed with scheduled and prn medication and ice packs. Incision is open to air, no drainage. Voiding adequate amounts. Fundus is firm and bleeding is appropriate. Patient is breastfeeding  q2-3h and on demand. Pt and spouse very loving towards infant and asking appropriate questions.          Problem: Postpartum ( Delivery)  Goal: Successful Parent Role Transition  Outcome: Progressing     Problem: Postpartum ( Delivery)  Goal: Fluid and Electrolyte Balance  Outcome: Progressing     Problem: Postpartum ( Delivery)  Goal: Optimal Pain Control and Function  Outcome: Progressing  Intervention: Prevent or Manage Pain  Recent Flowsheet Documentation  Taken 2024 by Noman Quezada, RN  Pain Management Interventions:   cold applied   medication (see MAR)  Perineal Care:   absorbent brief/pad changed   perineal hygiene encouraged   perineal spray bottle/warm water use encouraged   perineum cleansed  Taken 2024 by Noman Quezada, RN  Pain Management Interventions:   cold applied   medication (see MAR)     Problem: Postpartum ( Delivery)  Goal: Effective Urinary Elimination  Outcome: Progressing     Problem: Breastfeeding  Goal: Effective Breastfeeding  Outcome: Progressing  Intervention: Promote Breast Care and Comfort  Recent Flowsheet Documentation  Taken 2024 234 by Noman Quezada, RN  Breast Care: double electric breast pump utilized  Taken 2024 1945 by Noman Quezada, RN  Breast Care: double electric breast pump utilized

## 2024-09-15 NOTE — PROGRESS NOTES
POD 2  for bradycardia under general  Had a syncopal episodes 9-14 and again now at 4am.  She felt it coming on with the pressure she typically feels in her head before it starts.  Had a bowel movement and as she was getting up off the toilet the episode started.  She passed a little more stool then got to the wheelchair and that was when the full episode started.  She has gotten up twice prior tonight with the nurse and not had any issues.  Pt states she feels so much better now that she had a bowel movement.  Denies any current pain    Pressures checked twice and 80s/60s  Pt is cold and clammy but alert    Abdomen is soft and nontender    A/P  POD 2   Two syncopal episodes now postop  Will recheck CBC BMP and BNP  Will order a consult for the hospitalist in the morning   Will run fluids 125cc an hour the rest of the night.  ALMA Phillips MD

## 2024-09-16 ENCOUNTER — APPOINTMENT (OUTPATIENT)
Dept: CARDIOLOGY | Facility: HOSPITAL | Age: 34
End: 2024-09-16
Attending: HOSPITALIST
Payer: COMMERCIAL

## 2024-09-16 VITALS
TEMPERATURE: 99.1 F | HEART RATE: 90 BPM | BODY MASS INDEX: 29.95 KG/M2 | HEIGHT: 63 IN | SYSTOLIC BLOOD PRESSURE: 102 MMHG | OXYGEN SATURATION: 97 % | RESPIRATION RATE: 18 BRPM | DIASTOLIC BLOOD PRESSURE: 62 MMHG | WEIGHT: 169.06 LBS

## 2024-09-16 LAB
HGB BLD-MCNC: 7.1 G/DL (ref 11.7–15.7)
LVEF ECHO: NORMAL

## 2024-09-16 PROCEDURE — 250N000013 HC RX MED GY IP 250 OP 250 PS 637: Performed by: OBSTETRICS & GYNECOLOGY

## 2024-09-16 PROCEDURE — 93306 TTE W/DOPPLER COMPLETE: CPT | Mod: 26 | Performed by: INTERNAL MEDICINE

## 2024-09-16 PROCEDURE — 250N000013 HC RX MED GY IP 250 OP 250 PS 637: Performed by: FAMILY MEDICINE

## 2024-09-16 PROCEDURE — 93306 TTE W/DOPPLER COMPLETE: CPT

## 2024-09-16 PROCEDURE — 99232 SBSQ HOSP IP/OBS MODERATE 35: CPT | Performed by: HOSPITALIST

## 2024-09-16 PROCEDURE — 85018 HEMOGLOBIN: CPT | Performed by: FAMILY MEDICINE

## 2024-09-16 PROCEDURE — 36415 COLL VENOUS BLD VENIPUNCTURE: CPT | Performed by: FAMILY MEDICINE

## 2024-09-16 RX ORDER — IBUPROFEN 800 MG/1
800 TABLET, FILM COATED ORAL EVERY 6 HOURS
Qty: 60 TABLET | Refills: 1 | Status: SHIPPED | OUTPATIENT
Start: 2024-09-16

## 2024-09-16 RX ORDER — OXYCODONE HYDROCHLORIDE 5 MG/1
5 TABLET ORAL EVERY 4 HOURS PRN
Qty: 12 TABLET | Refills: 0 | Status: SHIPPED | OUTPATIENT
Start: 2024-09-16

## 2024-09-16 RX ORDER — ACETAMINOPHEN 325 MG/1
975 TABLET ORAL EVERY 6 HOURS PRN
COMMUNITY
Start: 2024-09-16

## 2024-09-16 RX ADMIN — Medication 1 TABLET: at 09:05

## 2024-09-16 RX ADMIN — ACETAMINOPHEN 975 MG: 325 TABLET ORAL at 03:20

## 2024-09-16 RX ADMIN — SENNOSIDES AND DOCUSATE SODIUM 1 TABLET: 8.6; 5 TABLET ORAL at 09:06

## 2024-09-16 RX ADMIN — IBUPROFEN 800 MG: 800 TABLET ORAL at 14:35

## 2024-09-16 RX ADMIN — IBUPROFEN 800 MG: 800 TABLET ORAL at 03:20

## 2024-09-16 RX ADMIN — ACETAMINOPHEN 975 MG: 325 TABLET ORAL at 14:35

## 2024-09-16 RX ADMIN — ACETAMINOPHEN 975 MG: 325 TABLET ORAL at 09:05

## 2024-09-16 RX ADMIN — IBUPROFEN 800 MG: 800 TABLET ORAL at 09:05

## 2024-09-16 ASSESSMENT — ACTIVITIES OF DAILY LIVING (ADL)
ADLS_ACUITY_SCORE: 19

## 2024-09-16 NOTE — LACTATION NOTE
Follow up Lactation Visit    Hours since delivery: 60 hours old    Gestational age at delivery: 41w2d     Diaper count: 3 wet 3 soiled dark green transitional color     Feedings: 10 breast 2 supplement, human donor milk 2-10ml     Weight Loss: 10% at 48 hours    Breastfeeding goals:6-12 months    Past breastfeeding experience:breastfeeding at the beginning, infant was sleepy, but once milk came in it went well. Infant did get RSV, therefore they switched to formula feeding to be able to see milk volumes, and Blanca than pumped.     Maternal health risk that may affect breastfeeding:ANATOLY ODEN 921    Home Pump: Mom Cozy and another breast pump    Breast assessment:Round, Large, and Symmetrical, Everted and Intact    Feeding assessment: Infant at left breast in traditional cross cradle hold. Mother stated latching is comfortable and infant is sleepier at times. Minimal swallows noted. Encouraged Mother to compress breast to encourage more active feeding. Discussed infant weight loss, sleepier feeds and infant nursing every 1-2hours around the clock, outside of normal cluster feeding. Recommended Mother offering supplement at least every other feeding along with starting pumping to promote milk production. Gave information sheet on where they can purchase human donor milk. Mother verbally stated she understood, but declines offering supplement after this feeding and will start pumping once she gets home.     Breastfeeding Plan:     Offer breast every 2-3 hours.   Massage breast to encourage milk flow   Strive for a deep and comfortable latch  Positioning reminders:  line up baby's nose to nipple   baby's chin touching the breast below the areola  ear, shoulder, hip, nice straight line   chin off chest  your thumb lined up like baby's mustache, fingers under breast like a baby's beard  cheeks touching breast  Switch sides when swallows slow, baby pauses lengthen and compressions do not help    Every other feed, and/or if  infant cueing more frequent, offer supplement after breastfeeding sessions to slow/stop weight loss. Than pump breasts for 15minutes for any bottle feeding sessions to promote milk production.     Feed expressed milk to baby using the amounts below as a guideline. Give more as baby cues. If necessary, make up difference with donor milk or formula as a bridge until milk supply increases.      0-24 hours is 2-10 ml per feeding   24-48 hours  is 5-15ml per feeding   48-72 hours is 15-30ml per feeding   72-96 hours is 30-60ml per feeding   96 hours and older follow healthcare providers recommendation     Education:   [x] Expected  feeding patterns in the first few days (pg. 38 of Your Guide to To Postpartum and  Care)/ the Second Night  [x] Stages of milk production  [] Hand expression   [x] Feeding cues     [] Benefits of skin to skin  [] Breastfeeding positions  [] Tips to get and maintain a deep latch  [] Usage of nipple shield  [x] Nutritive vs.non-nutritive sucking  [x] Gentle breast compressions as needed  [x] How to tell when baby is finished  [x] Supplementation  [x] Paced bottle feeding  [] Spoon/cup feeding  [] LPI feeding patterns  [] LBW feeding patterns  [x] Expected  output  [x]  weight loss  [] Infant Feeding Log  [] Calming techniques  [x] Engorgement/Reverse Pressure Softening  [x] Pumping  [x] Breastpump education  [x] Inpatient breastfeeding support  [x] Outpatient lactation resources    Follow up: Will follow up tomorrow, , if still inpatient

## 2024-09-16 NOTE — PLAN OF CARE
Goal Outcome Evaluation:      Plan of Care Reviewed With: patient    Overall Patient Progress: improvingOverall Patient Progress: improving    Outcome Evaluation: VSS. Orthostatic BP's completed this shift with no drop in BP. Patient was asymptomatic with sitting, standing, and ambulating to bathroom. Reports mild to moderate incisional pain, managed with scheduled medication and ice packs. Fundus firm and bleeding appropriate. Patient is voiding adequately. Breastfeeding infant q2-3h and on demand. Bonding well with . Spouse supportive at bedside.          Problem: Adult Inpatient Plan of Care  Goal: Optimal Comfort and Wellbeing  Outcome: Progressing  Intervention: Monitor Pain and Promote Comfort  Recent Flowsheet Documentation  Taken 9/15/2024 2050 by Noman Quezada, RN  Pain Management Interventions:   medication (see MAR)   cold applied     Problem: Postpartum ( Delivery)  Goal: Successful Parent Role Transition  Outcome: Progressing     Problem: Postpartum ( Delivery)  Goal: Fluid and Electrolyte Balance  Outcome: Progressing     Problem: Postpartum ( Delivery)  Goal: Optimal Pain Control and Function  Outcome: Progressing  Intervention: Prevent or Manage Pain  Recent Flowsheet Documentation  Taken 9/15/2024 2050 by Noman Quezada, RN  Pain Management Interventions:   medication (see MAR)   cold applied     Problem: Breastfeeding  Goal: Effective Breastfeeding  Outcome: Progressing

## 2024-09-16 NOTE — PLAN OF CARE
Blanca's vital and maternal assessment WDL. Incision is open to air, well approximated without s/s of infection. Pain adequately managed with scheduled Tylenol and Ibuprofen. Up with a SBA for safety d/t previous occurences of near syncope. Blanca has felt steady on her feet without s/s of near syncope. Blanca stated that she feels the episodes coming on with vision changes, and head pressure. Blanca is voiding without difficulty and caring for self and infant. Breastfeeding, a deep and comfortable latch observed. Given mothers love nipple cream and hydrogel pads for nipple discomfort. Blanca and her supportive  Sunny are attentive to infant needs/cues, asking appropriate questions and hold infant frequently. Positive attachment behaviors observed.      Problem: Adult Inpatient Plan of Care  Goal: Plan of Care Review  Description: The Plan of Care Review/Shift note should be completed every shift.  The Outcome Evaluation is a brief statement about your assessment that the patient is improving, declining, or no change.  This information will be displayed automatically on your shift  note.  Outcome: Progressing  Flowsheets (Taken 2024 0218)  Plan of Care Reviewed With:   patient   spouse  Overall Patient Progress: improving     Problem: Postpartum ( Delivery)  Goal: Optimal Pain Control and Function  Outcome: Progressing  Intervention: Prevent or Manage Pain  Recent Flowsheet Documentation  Taken 2024 0030 by Faiza Santos, RN  Perineal Care:   perineum cleansed   perineal spray bottle/warm water use encouraged   absorbent brief/pad changed  Taken 9/15/2024 2324 by Faiza Santos, RN  Pain Management Interventions:   care clustered   rest   repositioned  Perineal Care: perineal hygiene encouraged     Problem: Postpartum ( Delivery)  Goal: Effective Urinary Elimination  Outcome: Progressing  Intervention: Monitor and Manage Urinary Retention  Recent Flowsheet  Documentation  Taken 9/15/2024 2324 by Faiza Santos RN  Urinary Elimination Promotion: frequent voiding encouraged     Problem: Breastfeeding  Goal: Effective Breastfeeding  Outcome: Progressing  Intervention: Promote Breast Care and Comfort  Recent Flowsheet Documentation  Taken 9/16/2024 0030 by Faiza Santos RN  Breast Care: (given mothers love nipple cream and hydrogel pads for nipple discomfort/tenderness) other (see comments)  Taken 9/15/2024 2324 by Faiza Santos RN  Breast Care: Breastfeeding: open to air  Intervention: Promote Effective Breastfeeding  Recent Flowsheet Documentation  Taken 9/15/2024 2324 by Faiza Santos RN  Breastfeeding Assistance:   support offered   feeding cue recognition promoted   feeding session observed   feeding on demand promoted  Parent-Child Attachment Promotion:   caring behavior modeled   cue recognition promoted   interaction encouraged   parent/caregiver presence encouraged   participation in care promoted   rooming-in promoted   strengths emphasized  Intervention: Support Exclusive Breastfeeding Success  Recent Flowsheet Documentation  Taken 9/15/2024 2324 by Faiza Santos RN  Supportive Measures:   active listening utilized   positive reinforcement provided   self-care encouraged  Breastfeeding Support:   diary/feeding log utilized   encouragement provided   maternal rest encouraged   maternal hydration promoted   maternal nutrition promoted   Goal Outcome Evaluation:      Plan of Care Reviewed With: patient, spouse    Overall Patient Progress: improvingOverall Patient Progress: improving

## 2024-09-16 NOTE — PLAN OF CARE
Echo results received.  Called hospitalist to review.  They plan to see patient prior to discharge/    Ami Steiner MD FACOG  [facility]  [phone]

## 2024-09-16 NOTE — PROGRESS NOTES
[facility] RN Care Coordinator Note    Blanca Delong  [id number]  1990    Chart reviewed, discharge plan discussed with bedside RN, needs assessed. Patient, Blanca, requests home care visit, nurse visit planned Tuesday, 9/17/24. [facility] Intake contacted, updated by this writer; patient added to [facility] schedule. Follow-up post-delivery incision check appointment planned in 2 weeks at [facility] clinic; patient to schedule as instructed.    RN Care Coordinator will continue to follow and assist as needed with discharge plan.

## 2024-09-16 NOTE — PLAN OF CARE
"  Problem: Adult Inpatient Plan of Care  Goal: Plan of Care Review  Description: The Plan of Care Review/Shift note should be completed every shift.  The Outcome Evaluation is a brief statement about your assessment that the patient is improving, declining, or no change.  This information will be displayed automatically on your shift  note.  9/16/2024 1040 by Zamzam Galarza RN  Outcome: Progressing  9/16/2024 1040 by Zamzam Galarza RN  Outcome: Progressing  Flowsheets (Taken 9/16/2024 1040)  Plan of Care Reviewed With:   patient   spouse  Overall Patient Progress: improving  Goal: Patient-Specific Goal (Individualized)  Description: You can add care plan individualizations to a care plan. Examples of Individualization might be:  \"Parent requests to be called daily at 9am for status\", \"I have a hard time hearing out of my right ear\", or \"Do not touch me to wake me up as it startles  me\".  9/16/2024 1040 by Zamzam Galarza RN  Outcome: Progressing  9/16/2024 1040 by Zamzam Galarza RN  Outcome: Progressing  Goal: Absence of Hospital-Acquired Illness or Injury  9/16/2024 1040 by Zamzam Galarza RN  Outcome: Progressing  9/16/2024 1040 by Zamzam Galarza RN  Outcome: Progressing  Intervention: Prevent Skin Injury  Recent Flowsheet Documentation  Taken 9/16/2024 0900 by Zamzam Galarza RN  Body Position: position changed independently  Intervention: Prevent and Manage VTE (Venous Thromboembolism) Risk  Recent Flowsheet Documentation  Taken 9/16/2024 0900 by Zamzam Galarza RN  VTE Prevention/Management: (ambulation and fluids promoted) other (see comments)  Intervention: Prevent Infection  Recent Flowsheet Documentation  Taken 9/16/2024 0900 by Zamzam Galarza RN  Infection Prevention:   cohorting utilized   environmental surveillance performed   hand hygiene promoted   rest/sleep promoted   single patient room provided  Goal: Optimal Comfort and Wellbeing  9/16/2024 1040 by Zamzam Galarza" RN  Outcome: Progressing  9/16/2024 1040 by Zamzam Galarza RN  Outcome: Progressing  Intervention: Monitor Pain and Promote Comfort  Recent Flowsheet Documentation  Taken 9/16/2024 0905 by Zamzam Galarza RN  Pain Management Interventions:   care clustered   rest   repositioned  Intervention: Provide Person-Centered Care  Recent Flowsheet Documentation  Taken 9/16/2024 0900 by Zamzam Galarza RN  Trust Relationship/Rapport:   care explained   choices provided   questions encouraged   questions answered   reassurance provided  Goal: Readiness for Transition of Care  9/16/2024 1040 by Zamzam Galarza RN  Outcome: Progressing  9/16/2024 1040 by Zamzam Galarza RN  Outcome: Progressing   Goal Outcome Evaluation:      Plan of Care Reviewed With: patient, spouse    Overall Patient Progress: improvingOverall Patient Progress: improving       Patient says she is feeling better and free of any dizziness or syncope for over 24 hours now.  She denies preeclampsia symptoms.  Fundus is firm and lochia is light.  Hemoglobin is only 7.1 today.  Dr. Steiner spoke to her about a transfusion but she would like to continue taking PO iron and eat iron rich foods.  She is now having an echocardiogram done.  If all goes well with that she would like to discharge home today as soon as possible.

## 2024-09-16 NOTE — PROGRESS NOTES
"[facility] Hospitalist Progress Note  [facility]  Admission date: 9/13/2024    Summary:    34F with history of vasovagal near syncopal episodes admitted to the hospital for labor and delivery, postop day 2 status post LTCS.  Postpartum consult for near syncope     Assessment/Plan    #Near syncope - Agree that near syncope due to propensity for vasovagal syncope in the past now accentuated by volume loss and orthostasis.    -EKG NSR with normal Qtc.   -ECHO without underlying structural abnormalities.  -Transfusion per Ob.  Encouraged PO and iron supplements.  Advised caution until steady on feet and especially when bathing/shower as hot water could cause vasodilation and hypotension.  -ok for discharge from my perspective    Checklist:  Code Status: Full Code    Diet: Room Service  Advance Diet as Tolerated: Regular Diet Adult      Disposition and Discharge Planning  Number of days selected below is from a list of system pre-approved options.     Medically Ready for Discharge:       System Identified Risk Variables  Auto-populated based on system request.  If more complex management decisions required, to be addressed above.  \"  Clinically Significant Risk Factors              # Hypoalbuminemia: Lowest albumin = 2.7 g/dL at 9/15/2024  6:45 AM, will monitor as appropriate               # Overweight: Estimated body mass index is 29.95 kg/m  as calculated from the following:    Height as of this encounter: 1.6 m (5' 3\").    Weight as of this encounter: 76.7 kg (169 lb 1 oz)., PRESENT ON ADMISSION                  \"    Interval Events/Subjective/Notable results:    Orthostatic by vital signs (102/46 HR 78 to 86/47 and HR 99) and received IVF.  Trigger for near syncope position change, BM, urination, hot showers if ill.  No CP or SOB.  No recurrent episodes since IVF yesterday and overnight.    Reviewed: hb, orthostatics, ECHO    Objective    Vital signs in last 24 hours  Temp:  [97.6  F (36.4  C)-99.5  F " (37.5  C)] 99.1  F (37.3  C)  Pulse:  [74-90] 90  Resp:  [16-18] 18  BP: ()/(46-83) 102/62  SpO2:  [97 %-98 %] 97 % O2 Device: None (Room air)    Weight:   169 lbs 1 oz  Body mass index is 29.95 kg/m .    Intake/Output last 3 shifts  I/O last 3 completed shifts:  In: 1780 [I.V.:1780]  Out: -     Physical Exam  General:  Alert, cooperative, no distress    Neurologic:  oriented, facial symmetry preserved, fluent speech.   Psych: calm  HEENT:  Anicteric, MMM  Lungs:  Easyrespirations  Skin: no rashes noted on exposed skin.    Rutherford Catheter: Not present  Lines: None          Medical Decision Making               Kimberly Menendez MD, [facility]  Internal Medicine Hospitalist

## 2024-09-16 NOTE — DISCHARGE INSTRUCTIONS
*You have a Home Care nurse visit planned for Tuesday, 9/17/24. The nurse will contact you after discharge to confirm the appointment time. If you do not hear from the nurse by Tuesday morning, please call [phone].   (Please do not schedule a clinic appointment on the same day as home nurse visit.)      Warning Signs after Having a Baby    Keep this paper on your fridge or somewhere else where you can see it.    Call your provider if you have any of these symptoms up to 12 weeks after having your baby.    Thoughts of hurting yourself or your baby  Pain in your chest or trouble breathing  Severe headache not helped by pain medicine  Eyesight concerns (blurry vision, seeing spots or flashes of light, other changes to eyesight)  Fainting, shaking or other signs of a seizure    Call 9-1-1 if you feel that it is an emergency.     The symptoms below can happen to anyone after giving birth. They can be very serious. Call your provider if you have any of these warning signs.    My provider s phone number: _______________________    Losing too much blood (hemorrhage)    Call your provider if you soak through a pad in less than an hour or pass blood clots bigger than a golf ball. These may be signs that you are bleeding too much.    Blood clots in the legs or lungs    After you give birth, your body naturally clots its blood to help prevent blood loss. Sometimes this increased clotting can happen in other areas of the body, like the legs or lungs. This can block your blood flow and be very dangerous.     Call your provider if you:  Have a red, swollen spot on the back of your leg that is warm or painful when you touch it.   Are coughing up blood.     Infection    Call your provider if you have any of these symptoms:  Fever of 100.4 F (38 C) or higher.  Pain or redness around your stitches if you had an incision.   Any yellow, white, or green fluid coming from places where you had stitches or surgery.    Mood Problems  (postpartum depression)    Many people feel sad or have mood changes after having a baby. But for some people, these mood swings are worse.     Call your provider right away if you feel so anxious or nervous that you can't care for yourself or your baby.    Preeclampsia (high blood pressure)    Even if you didn't have high blood pressure when you were pregnant, you are at risk for the high blood pressure disease called preeclampsia. This risk can last up to 12 weeks after giving birth.     Call your provider if you have:   Pain on your right side under your rib cage  Sudden swelling in the hands and face    Remember: You know your body. If something doesn't feel right, get medical help.     For informational purposes only. Not to replace the advice of your health care provider. Copyright 2020 [facility]. All rights reserved. Clinically reviewed by Shanell Mendoza RNC-OB, MSN. "[employer] 730661 - Rev 02/23.

## 2024-09-16 NOTE — PLAN OF CARE
"  Problem: Adult Inpatient Plan of Care  Goal: Plan of Care Review  Description: The Plan of Care Review/Shift note should be completed every shift.  The Outcome Evaluation is a brief statement about your assessment that the patient is improving, declining, or no change.  This information will be displayed automatically on your shift  note.  9/16/2024 1353 by Zamzam Galarza RN  Outcome: Adequate for Care Transition  Flowsheets (Taken 9/16/2024 1353)  Plan of Care Reviewed With:   patient   spouse  9/16/2024 1040 by Zamzam Galarza RN  Outcome: Progressing  9/16/2024 1040 by Zamzam Galarza RN  Outcome: Progressing  Flowsheets (Taken 9/16/2024 1040)  Plan of Care Reviewed With:   patient   spouse  Overall Patient Progress: improving  Goal: Patient-Specific Goal (Individualized)  Description: You can add care plan individualizations to a care plan. Examples of Individualization might be:  \"Parent requests to be called daily at 9am for status\", \"I have a hard time hearing out of my right ear\", or \"Do not touch me to wake me up as it startles  me\".  9/16/2024 1353 by Zamzam Galarza RN  Outcome: Adequate for Care Transition  9/16/2024 1040 by Zamzam Galarza RN  Outcome: Progressing  9/16/2024 1040 by Zamzam Galarza RN  Outcome: Progressing  Goal: Absence of Hospital-Acquired Illness or Injury  9/16/2024 1353 by Zamzam Galarza RN  Outcome: Adequate for Care Transition  9/16/2024 1040 by Zamzam Galarza RN  Outcome: Progressing  9/16/2024 1040 by Zamzam Galarza RN  Outcome: Progressing  Intervention: Prevent Skin Injury  Recent Flowsheet Documentation  Taken 9/16/2024 0900 by Zazmam Galarza RN  Body Position: position changed independently  Intervention: Prevent and Manage VTE (Venous Thromboembolism) Risk  Recent Flowsheet Documentation  Taken 9/16/2024 0900 by Zamzam Galarza RN  VTE Prevention/Management: (ambulation and fluids promoted) other (see comments)  Intervention: Prevent " Infection  Recent Flowsheet Documentation  Taken 9/16/2024 0900 by Zamzam Galarza RN  Infection Prevention:   cohorting utilized   environmental surveillance performed   hand hygiene promoted   rest/sleep promoted   single patient room provided  Goal: Optimal Comfort and Wellbeing  9/16/2024 1353 by Zamzam Galarza RN  Outcome: Adequate for Care Transition  9/16/2024 1040 by Zamzam Galarza RN  Outcome: Progressing  9/16/2024 1040 by Zamzam Galarza RN  Outcome: Progressing  Intervention: Monitor Pain and Promote Comfort  Recent Flowsheet Documentation  Taken 9/16/2024 0905 by Zamzam Galarza RN  Pain Management Interventions:   care clustered   rest   repositioned  Intervention: Provide Person-Centered Care  Recent Flowsheet Documentation  Taken 9/16/2024 0900 by Zamzam Galarza RN  Trust Relationship/Rapport:   care explained   choices provided   questions encouraged   questions answered   reassurance provided  Goal: Readiness for Transition of Care  9/16/2024 1353 by Zamzam Galarza RN  Outcome: Adequate for Care Transition  9/16/2024 1040 by Zamzam Galarza RN  Outcome: Progressing  9/16/2024 1040 by Zamzam Galarza RN  Outcome: Progressing   Goal Outcome Evaluation:      Plan of Care Reviewed With: patient, spouse    Overall Patient Progress: improvingOverall Patient Progress: improving     Blanca has been cleared for discharge.  She will have a nurse home visit tomorrow.

## 2024-09-16 NOTE — DISCHARGE SUMMARY
Discharge Summary    Blanca Delong MRN# [id number]   Age: 34 year old YOB: 1990     Date of Admission:  2024  Date of Discharge::  2024  Admitting Physician:  Miley Corrales MD  Discharge Physician:  Ami Steiner MD     Home clinic: Dr. Miley Corrales          Admission Diagnoses:   Encounter for induction of labor  Intrauterine pregnancy at 41w2d          Discharge Diagnosis:   Same   Acute blood loss anemia with presyncope  S/p emergency  delivery          Procedures:     Procedure(s): Low transverse   delivery via Pfannenstiel incision under general anesthesia for fetal bradycardia                Medications Prior to Admission:     Medications Prior to Admission   Medication Sig Dispense Refill Last Dose    balsalazide (COLAZAL) 750 MG capsule Take 750 mg by mouth 2 times daily.   Past Week    Prenatal Vit-Fe Sulfate-FA (PRENATAL MULTIVIT-IRON PO) Take 1 tablet by mouth   2024    [DISCONTINUED] acetaminophen (TYLENOL) 325 MG tablet Take 2 tablets (650 mg) by mouth every 4 hours as needed for mild pain or fever (greater than or equal to 38  C /100.4  F (oral) or 38.5  C/ 101.4  F (core).)                Discharge Medications:        Review of your medicines        START taking        Dose / Directions   Elemental iron 65 mg Vitamin C 125 mg  MG Tabs tablet  Commonly known as: VITRON C  Used for: S/P emergency  section      Dose: 1 tablet  Start taking on: 2024  Take 1 tablet by mouth daily.  Refills: 0     ibuprofen 800 MG tablet  Commonly known as: ADVIL/MOTRIN  Used for: S/P emergency  section      Dose: 800 mg  Take 1 tablet (800 mg) by mouth every 6 hours.  Quantity: 60 tablet  Refills: 1     oxyCODONE 5 MG tablet  Commonly known as: ROXICODONE  Used for: S/P emergency  section      Dose: 5 mg  Take 1 tablet (5 mg) by mouth every 4 hours as needed.  Quantity: 12 tablet  Refills: 0             CONTINUE these medicines which may have CHANGED, or have new prescriptions. If we are uncertain of the size of tablets/capsules you have at home, strength may be listed as something that might have changed.        Dose / Directions   acetaminophen 325 MG tablet  Commonly known as: TYLENOL  This may have changed:   how much to take  when to take this  reasons to take this  Used for: S/P emergency  section      Dose: 975 mg  Take 3 tablets (975 mg) by mouth every 6 hours as needed for mild pain.  Refills: 0            CONTINUE these medicines which have NOT CHANGED        Dose / Directions   balsalazide 750 MG capsule  Commonly known as: COLAZAL      Dose: 750 mg  Take 750 mg by mouth 2 times daily.  Refills: 0     PRENATAL MULTIVIT-IRON PO      Dose: 1 tablet  Take 1 tablet by mouth  Refills: 0               Where to get your medicines        These medications were sent to [id number] - [place], [place] - [facility] [facility] [address] [facility], [place] [address]      Phone: [phone]   ibuprofen 800 MG tablet       Some of these will need a paper prescription and others can be bought over the counter. Ask your nurse if you have questions.    Bring a paper prescription for each of these medications  oxyCODONE 5 MG tablet  You don't need a prescription for these medications  acetaminophen 325 MG tablet  Elemental iron 65 mg Vitamin C 125 mg  MG Tabs tablet               Consultations:   Hospital medicine for pre-syncope.  EKG and maternal echo.          Brief History of Labor:   Blanca Delong is a 34 year old  who presented for planned induction that was managed by her primary OB team.  In second stage she had fetal bradycardia and in house OB was consulted.  Fetal station was felt to be too high for operative vaginal delivery so she underwent STAT  section.           Hospital Course:   The patient's hospital  "course was remarkable for feeling pre-syncopal on two occasions with notable orthostasis.  She recovered with IV fluids and support.  On the day of discharge,she was feeling a lot better.  Ambulating independently and denied dizziness. Risks and benefits of blood transfusion discussed and she declines at this time/  She is able to take oral iron supplementation.    Post-partum hemoglobin:   Hemoglobin   Date Value Ref Range Status   09/16/2024 7.1 (L) 11.7 - 15.7 g/dL Final       Day of discharge assessment:   /62 (BP Location: Left arm, Patient Position: Standing, Cuff Size: Adult Regular)   Pulse 90   Temp 99.1  F (37.3  C) (Oral)   Resp 18   Ht 1.6 m (5' 3\")   Wt 76.7 kg (169 lb 1 oz)   SpO2 97%   Breastfeeding Unknown   BMI 29.95 kg/m    Abdomen: Soft, fundus firm, incision and open to air, mild bruising inferiorly          Discharge Instructions and Follow-Up:     Discharge diet: Regular   Discharge activity: Your activity upon discharge: no lifting >15 lbs or strenuous exercise for 6 weeks, no driving for 2 weeks or until you can slam on the breaks w/o pain, nothing in the vagina for 6 weeks (no tampons, intercourse, douching).    Discharge follow-up: Two weeks for incision check at [facility] in 1-3 days.  6 weeks for postpartum visit with Dr. Corrales   Wound care:   Keep incision clean and dry.           Discharge Disposition:     Discharged to home      Attestation:  I have reviewed today's vital signs, notes, medications, labs and imaging.  Amount of time performed on this discharge summary: 15 minutes.  Total time: 35 minutes    Ami Steiner MD         "

## 2025-03-16 ENCOUNTER — HEALTH MAINTENANCE LETTER (OUTPATIENT)
Age: 35
End: 2025-03-16

## (undated) DEVICE — SUTURE MONOCRYL+ 0 CT-1 UND 18 MCP946H

## (undated) DEVICE — SUCTION TIP YANKAUER W/O VENT K86

## (undated) DEVICE — CUSTOM PACK C-SECTION LHE

## (undated) DEVICE — PAD BLADDER CNTRL SM BL 4IN X 9.75IN  1100B

## (undated) DEVICE — SU VICRYL+ 3-0 CT1 36IN UND VCP944H

## (undated) DEVICE — SUTURE VICRYL+ 0 36IN CTX VIOLET VCP978H

## (undated) DEVICE — GLOVE UNDER INDICATOR PI SZ 7.0 LF 41670

## (undated) DEVICE — SOL NACL 0.9% IRRIG 1000ML BOTTLE 2F7124

## (undated) DEVICE — PREP CHLORAPREP 26ML TINTED HI-LITE ORANGE 930815

## (undated) DEVICE — BAG BIOHAZARD RED SMALL 24X23" A4823PR

## (undated) DEVICE — CATH SUCTION 10FR W/TRAP DYND44110

## (undated) DEVICE — UNDERPAD 30X30 BULK 949B10

## (undated) DEVICE — SUCTION MANIFOLD NEPTUNE 2 SYS 1 PORT 702-025-000

## (undated) DEVICE — STPL SKIN SUBCUTICULAR INSORB  2030

## (undated) DEVICE — DRSG PRIMAPORE 04X11 3/4"

## (undated) DEVICE — ESU GROUND PAD ADULT REM W/15' CORD E7507DB

## (undated) DEVICE — SU VICRYL+ 2-0 XLH 27IN VIO VCP581G

## (undated) DEVICE — SOL WATER IRRIG 1000ML BOTTLE 2F7114

## (undated) DEVICE — SURGICEL POWDER ABSORBABLE HEMOSTAT 3GM 3013SP

## (undated) DEVICE — PACK MINOR SINGLE BASIN SSK3001

## (undated) DEVICE — GLOVE PI ULTRATCH M LF SZ 6.5 PF CUFF TEXT STRL LF 42665

## (undated) RX ORDER — MORPHINE SULFATE 1 MG/ML
INJECTION, SOLUTION EPIDURAL; INTRATHECAL; INTRAVENOUS
Status: DISPENSED
Start: 2024-09-13

## (undated) RX ORDER — FENTANYL CITRATE 50 UG/ML
INJECTION, SOLUTION INTRAMUSCULAR; INTRAVENOUS
Status: DISPENSED
Start: 2024-09-13

## (undated) RX ORDER — BUPIVACAINE HYDROCHLORIDE 2.5 MG/ML
INJECTION, SOLUTION EPIDURAL; INFILTRATION; INTRACAUDAL
Status: DISPENSED
Start: 2024-09-13